# Patient Record
Sex: MALE | Race: WHITE | Employment: OTHER | URBAN - METROPOLITAN AREA
[De-identification: names, ages, dates, MRNs, and addresses within clinical notes are randomized per-mention and may not be internally consistent; named-entity substitution may affect disease eponyms.]

---

## 2019-04-06 ENCOUNTER — OFFICE VISIT (OUTPATIENT)
Dept: FAMILY MEDICINE CLINIC | Age: 69
End: 2019-04-06
Payer: COMMERCIAL

## 2019-04-06 VITALS
HEART RATE: 80 BPM | DIASTOLIC BLOOD PRESSURE: 84 MMHG | SYSTOLIC BLOOD PRESSURE: 122 MMHG | WEIGHT: 252.4 LBS | OXYGEN SATURATION: 98 % | BODY MASS INDEX: 36.13 KG/M2 | RESPIRATION RATE: 12 BRPM | TEMPERATURE: 98.5 F | HEIGHT: 70 IN

## 2019-04-06 DIAGNOSIS — H66.91 RIGHT OTITIS MEDIA, UNSPECIFIED OTITIS MEDIA TYPE: Primary | ICD-10-CM

## 2019-04-06 DIAGNOSIS — J01.90 ACUTE SINUSITIS, RECURRENCE NOT SPECIFIED, UNSPECIFIED LOCATION: ICD-10-CM

## 2019-04-06 PROCEDURE — G8417 CALC BMI ABV UP PARAM F/U: HCPCS | Performed by: NURSE PRACTITIONER

## 2019-04-06 PROCEDURE — 4040F PNEUMOC VAC/ADMIN/RCVD: CPT | Performed by: NURSE PRACTITIONER

## 2019-04-06 PROCEDURE — 4004F PT TOBACCO SCREEN RCVD TLK: CPT | Performed by: NURSE PRACTITIONER

## 2019-04-06 PROCEDURE — 3017F COLORECTAL CA SCREEN DOC REV: CPT | Performed by: NURSE PRACTITIONER

## 2019-04-06 PROCEDURE — G8598 ASA/ANTIPLAT THER USED: HCPCS | Performed by: NURSE PRACTITIONER

## 2019-04-06 PROCEDURE — 99213 OFFICE O/P EST LOW 20 MIN: CPT | Performed by: NURSE PRACTITIONER

## 2019-04-06 PROCEDURE — 1123F ACP DISCUSS/DSCN MKR DOCD: CPT | Performed by: NURSE PRACTITIONER

## 2019-04-06 PROCEDURE — G8427 DOCREV CUR MEDS BY ELIG CLIN: HCPCS | Performed by: NURSE PRACTITIONER

## 2019-04-06 RX ORDER — FLUTICASONE PROPIONATE 50 MCG
2 SPRAY, SUSPENSION (ML) NASAL DAILY
Qty: 1 BOTTLE | Refills: 0 | Status: SHIPPED | OUTPATIENT
Start: 2019-04-06 | End: 2021-05-27

## 2019-04-06 RX ORDER — AMOXICILLIN AND CLAVULANATE POTASSIUM 875; 125 MG/1; MG/1
1 TABLET, FILM COATED ORAL 2 TIMES DAILY
Qty: 14 TABLET | Refills: 0 | Status: SHIPPED | OUTPATIENT
Start: 2019-04-06 | End: 2019-04-13

## 2019-04-06 ASSESSMENT — ENCOUNTER SYMPTOMS
SINUS PRESSURE: 0
SORE THROAT: 0
COUGH: 0
WHEEZING: 0
SHORTNESS OF BREATH: 0
RHINORRHEA: 0
SINUS PAIN: 0

## 2019-04-06 NOTE — PROGRESS NOTES
Subjective:      Patient ID: Purvi Cordova is a 80 y.o. male who presents today for:  Chief Complaint   Patient presents with    Otalgia     Right ear pain X 3 months. Pain is aggravated with chewing and blowing his nose. He admits to a chronic cough throughout the winter months. Otalgia    There is pain in the right ear. This is a new problem. Episode onset: x 3 months ago. The problem has been unchanged. There has been no fever. Pertinent negatives include no coughing, rhinorrhea or sore throat. He has tried nothing (Was on Augmenin back in Jan for sinusitis) for the symptoms. Still having sinus issues, runny nose, congestion    Had terrible cough back in Jan which has since subsided  Had tooth pulled in Dec, noticed pain around that time    Past Medical History:   Diagnosis Date    Screening for diabetic retinopathy     March 2014 outside  : Per member     No past surgical history on file. No family history on file. Current Outpatient Medications on File Prior to Visit   Medication Sig Dispense Refill    lisinopril (PRINIVIL;ZESTRIL) 5 MG tablet TAKE ONE TABLET BY MOUTH TWICE A  tablet 0    Handicap Placard MISC by Does not apply route Duration 1 year 1 each 0    metFORMIN (GLUCOPHAGE) 500 MG tablet TAKE ONE TABLET BY MOUTH TWICE A DAY WITH FOOD FOR DIABETES OR AS DIRECTED 180 tablet 1    metoprolol (LOPRESSOR) 25 MG tablet TAKE ONE TABLET BY MOUTH TWICE A  tablet 3    aspirin 81 MG tablet Take 81 mg by mouth daily. No current facility-administered medications on file prior to visit. Allergies:  Sulfamethoxazole-trimethoprim    Review of Systems   Constitutional: Negative for chills and fever. HENT: Positive for congestion and ear pain. Negative for postnasal drip, rhinorrhea, sinus pressure, sinus pain and sore throat. Respiratory: Negative for cough, shortness of breath and wheezing. Cardiovascular: Negative for chest pain.        Objective: improve, for follow up with PCP. Discussed possibility of TMJ disorder also. Patient has follow up appointment with Dr. Destinee Beal already scheduled for 4/15. Encouraged eating yogurt or taking probiotic daily while on atb to help promote gut health as the use of any atb can cause intestinal infections such as c.diff. Reviewed with the patient: current clinical status, medications, activities and diet. Discussed signs and symptoms which require immediate follow-up in ED/call to 911. Understanding verbalized. Side effects, adverse effects of the medication prescribed today, as well as treatment plan/ rationale and result expectations have been discussed with the patient who expresses understanding and desires to proceed. Close follow up to evaluate treatment results and for coordination of care. I have reviewed the patient's medical history in detail and updated the computerized patient record.     Ramona Galvez, ERIC - CNP

## 2019-05-03 ENCOUNTER — OFFICE VISIT (OUTPATIENT)
Dept: FAMILY MEDICINE CLINIC | Age: 69
End: 2019-05-03
Payer: COMMERCIAL

## 2019-05-03 VITALS
OXYGEN SATURATION: 98 % | HEIGHT: 70 IN | RESPIRATION RATE: 20 BRPM | SYSTOLIC BLOOD PRESSURE: 112 MMHG | WEIGHT: 250 LBS | TEMPERATURE: 98.3 F | BODY MASS INDEX: 35.79 KG/M2 | DIASTOLIC BLOOD PRESSURE: 64 MMHG | HEART RATE: 73 BPM

## 2019-05-03 DIAGNOSIS — E78.5 TYPE 2 DIABETES MELLITUS WITH HYPERLIPIDEMIA (HCC): Primary | ICD-10-CM

## 2019-05-03 DIAGNOSIS — H66.91 RIGHT OTITIS MEDIA, UNSPECIFIED OTITIS MEDIA TYPE: ICD-10-CM

## 2019-05-03 DIAGNOSIS — I25.118 CORONARY ARTERY DISEASE INVOLVING NATIVE HEART WITH OTHER FORM OF ANGINA PECTORIS, UNSPECIFIED VESSEL OR LESION TYPE (HCC): ICD-10-CM

## 2019-05-03 DIAGNOSIS — E78.5 TYPE 2 DIABETES MELLITUS WITH HYPERLIPIDEMIA (HCC): ICD-10-CM

## 2019-05-03 DIAGNOSIS — E11.69 TYPE 2 DIABETES MELLITUS WITH HYPERLIPIDEMIA (HCC): Primary | ICD-10-CM

## 2019-05-03 DIAGNOSIS — E11.69 TYPE 2 DIABETES MELLITUS WITH HYPERLIPIDEMIA (HCC): ICD-10-CM

## 2019-05-03 DIAGNOSIS — Z12.11 SCREEN FOR COLON CANCER: ICD-10-CM

## 2019-05-03 LAB
CREATININE URINE: 116.9 MG/DL
HBA1C MFR BLD: 6.2 %
MICROALBUMIN UR-MCNC: 67.9 MG/DL
MICROALBUMIN/CREAT UR-RTO: 580.8 MG/G (ref 0–30)

## 2019-05-03 PROCEDURE — 3017F COLORECTAL CA SCREEN DOC REV: CPT | Performed by: FAMILY MEDICINE

## 2019-05-03 PROCEDURE — 4040F PNEUMOC VAC/ADMIN/RCVD: CPT | Performed by: FAMILY MEDICINE

## 2019-05-03 PROCEDURE — 1036F TOBACCO NON-USER: CPT | Performed by: FAMILY MEDICINE

## 2019-05-03 PROCEDURE — 2022F DILAT RTA XM EVC RTNOPTHY: CPT | Performed by: FAMILY MEDICINE

## 2019-05-03 PROCEDURE — 83036 HEMOGLOBIN GLYCOSYLATED A1C: CPT | Performed by: FAMILY MEDICINE

## 2019-05-03 PROCEDURE — 1123F ACP DISCUSS/DSCN MKR DOCD: CPT | Performed by: FAMILY MEDICINE

## 2019-05-03 PROCEDURE — G8598 ASA/ANTIPLAT THER USED: HCPCS | Performed by: FAMILY MEDICINE

## 2019-05-03 PROCEDURE — G8427 DOCREV CUR MEDS BY ELIG CLIN: HCPCS | Performed by: FAMILY MEDICINE

## 2019-05-03 PROCEDURE — G8417 CALC BMI ABV UP PARAM F/U: HCPCS | Performed by: FAMILY MEDICINE

## 2019-05-03 PROCEDURE — 99204 OFFICE O/P NEW MOD 45 MIN: CPT | Performed by: FAMILY MEDICINE

## 2019-05-03 PROCEDURE — 3044F HG A1C LEVEL LT 7.0%: CPT | Performed by: FAMILY MEDICINE

## 2019-05-03 SDOH — HEALTH STABILITY: MENTAL HEALTH: HOW MANY STANDARD DRINKS CONTAINING ALCOHOL DO YOU HAVE ON A TYPICAL DAY?: 1 OR 2

## 2019-05-03 SDOH — HEALTH STABILITY: MENTAL HEALTH: HOW OFTEN DO YOU HAVE A DRINK CONTAINING ALCOHOL?: 2-4 TIMES A MONTH

## 2019-05-03 ASSESSMENT — ENCOUNTER SYMPTOMS
EYES NEGATIVE: 1
ALLERGIC/IMMUNOLOGIC NEGATIVE: 1
RESPIRATORY NEGATIVE: 1
GASTROINTESTINAL NEGATIVE: 1
SHORTNESS OF BREATH: 0

## 2019-05-03 ASSESSMENT — PATIENT HEALTH QUESTIONNAIRE - PHQ9
2. FEELING DOWN, DEPRESSED OR HOPELESS: 0
1. LITTLE INTEREST OR PLEASURE IN DOING THINGS: 0
SUM OF ALL RESPONSES TO PHQ9 QUESTIONS 1 & 2: 0
SUM OF ALL RESPONSES TO PHQ QUESTIONS 1-9: 0
SUM OF ALL RESPONSES TO PHQ QUESTIONS 1-9: 0

## 2019-05-03 NOTE — PROGRESS NOTES
Patient is seen in follow up for   Chief Complaint   Patient presents with   [facility] Doctor     Patient here to establish care, former PCP Dr. Brandon James Maintenance     advised to have dm eye exam, - foot exam, blood work      Diabetes   He presents for his follow-up diabetic visit. He has type 2 diabetes mellitus. His disease course has been stable. There are no hypoglycemic associated symptoms. There are no diabetic associated symptoms. Pertinent negatives for diabetes include no chest pain. There are no hypoglycemic complications. Symptoms are stable. There are no diabetic complications. Risk factors for coronary artery disease include diabetes mellitus, hypertension and male sex. Current diabetic treatment includes oral agent (monotherapy). He is compliant with treatment most of the time. Past Medical History:   Diagnosis Date    Screening for diabetic retinopathy     March 2014 outside  : Per member     Patient Active Problem List    Diagnosis Date Noted    Type 2 diabetes mellitus with hyperlipidemia ([facility] [address] 04/12/2016    Knee pain 06/19/2015    Encounter for physical therapy 06/19/2015    Presence of stent in anterior descending branch of left coronary artery 12/21/2009    Acute MI ([facility] [address] 12/21/2009    CAD (coronary artery disease) 11/27/2009    History of tobacco use 11/27/2009    Hx of vasectomy 01/28/2002     Past Surgical History:   Procedure Laterality Date    CATARACT REMOVAL WITH IMPLANT Bilateral 2018     History reviewed. No pertinent family history.   Social History     Socioeconomic History    Marital status:      Spouse name: None    Number of children: None    Years of education: None    Highest education level: None   Occupational History    None   Social Needs    Financial resource strain: None    Food insecurity:     Worry: None     Inability: None    Transportation needs:     Medical: None     Non-medical: None   Tobacco Use    Smoking status: TABLET BY MOUTH TWICE A DAY WITH FOOD FOR DIABETES OR AS DIRECTED 180 tablet 1    metoprolol (LOPRESSOR) 25 MG tablet TAKE ONE TABLET BY MOUTH TWICE A  tablet 3    aspirin 81 MG tablet Take 81 mg by mouth daily. No current facility-administered medications on file prior to visit. Allergies   Allergen Reactions    Sulfamethoxazole-Trimethoprim Hives and Other (See Comments)     07/18/2005;SULFA, 07/18/2005;SULFA     Health Maintenance   Topic Date Due    Hepatitis C screen  1950    Diabetic microalbuminuria test  03/14/2016    Lipid screen  03/14/2016    Diabetic retinal exam  07/30/2016    Diabetic foot exam  08/31/2016    A1C test (Diabetic or Prediabetic)  08/31/2016    Potassium monitoring  08/31/2016    Creatinine monitoring  08/31/2016    Colon Cancer Screen FIT/FOBT  09/03/2016    Shingles Vaccine (2 of 3) 05/28/2018    DTaP/Tdap/Td vaccine (2 - Td) 12/30/2025    Flu vaccine  Completed    Pneumococcal 65+ years Vaccine  Completed       Review of Systems     Review of Systems   Constitutional: Negative for activity change, appetite change, chills, fever and unexpected weight change. HENT: Negative. Eyes: Negative. Respiratory: Negative. Negative for shortness of breath. Cardiovascular: Negative. Negative for chest pain and palpitations. Gastrointestinal: Negative. Endocrine: Negative. Genitourinary: Negative. Musculoskeletal: Negative. Skin: Negative. Allergic/Immunologic: Negative. Neurological: Negative. Hematological: Negative. Psychiatric/Behavioral: Negative. Physical Exam  Vitals:    05/03/19 1114   BP: 112/64   Site: Left Upper Arm   Position: Sitting   Cuff Size: Large Adult   Pulse: 73   Resp: 20   Temp: 98.3 °F (36.8 °C)   TempSrc: Oral   SpO2: 98%   Weight: 250 lb (113.4 kg)   Height: 5' 10\" (1.778 m)       Physical Exam   Constitutional: He is oriented to person, place, and time.  He appears well-developed and well-nourished. HENT:   Right Ear: External ear normal.   Left Ear: External ear normal.   Eyes: Pupils are equal, round, and reactive to light. Conjunctivae and EOM are normal.   Neck: Normal range of motion. Neck supple. No thyromegaly present. Cardiovascular: Normal rate, regular rhythm, normal heart sounds and intact distal pulses. Exam reveals no gallop and no friction rub. No murmur heard. Pulmonary/Chest: Effort normal and breath sounds normal. No respiratory distress. He has no wheezes. Abdominal: Soft. Bowel sounds are normal. He exhibits no distension and no mass. There is no tenderness. There is no rebound and no guarding. No hernia. Genitourinary: Penis normal.   Musculoskeletal: Normal range of motion. He exhibits no edema or tenderness. Lymphadenopathy:     He has no cervical adenopathy. Neurological: He is alert and oriented to person, place, and time. No cranial nerve deficit. Coordination normal.   Skin: Skin is warm and dry. Psychiatric: He has a normal mood and affect. Assessment   Diagnosis Orders   1. Type 2 diabetes mellitus with hyperlipidemia (HCC)  POCT glycosylated hemoglobin (Hb A1C)    Comprehensive Metabolic Panel    Microalbumin / Creatinine Urine Ratio    Lipid Panel     DIABETES FOOT EXAM   2. Screen for colon cancer  An Swanson MD, Gastroenterology, [place]   3. Right otitis media, unspecified otitis media type     4.  Coronary artery disease involving native heart with other form of angina pectoris, unspecified vessel or lesion type [facility])  Dion Stuart MD, Cardiology, [place]     Problem List     CAD (coronary artery disease)    Relevant Medications    aspirin 81 MG tablet    metoprolol (LOPRESSOR) 25 MG tablet    lisinopril (PRINIVIL;ZESTRIL) 5 MG tablet    Other Relevant Orders    Dion Stuart MD, Cardiology, [place]    Type 2 diabetes mellitus with hyperlipidemia ([facility] [address] - Primary    Relevant Medications    aspirin 81 MG tablet    metoprolol (LOPRESSOR) 25 MG tablet    metFORMIN (GLUCOPHAGE) 500 MG tablet    lisinopril (PRINIVIL;ZESTRIL) 5 MG tablet    Other Relevant Orders    POCT glycosylated hemoglobin (Hb A1C) (Completed)    Comprehensive Metabolic Panel    Microalbumin / Creatinine Urine Ratio    Lipid Panel    HM DIABETES FOOT EXAM (Completed)          Plan  Orders Placed This Encounter   Procedures    Comprehensive Metabolic Panel     Standing Status:   Future     Standing Expiration Date:   5/2/2020    Microalbumin / Creatinine Urine Ratio     Standing Status:   Future     Standing Expiration Date:   5/2/2020    Lipid Panel     Standing Status:   Future     Standing Expiration Date:   5/2/2020     Order Specific Question:   Is Patient Fasting?/# of Hours     Answer:   8-12   Dominic Banuelos MD, Gastroenterology, [facility]     Referral Priority:   Routine     Referral Type:   Eval and Treat     Referral Reason:   Specialty Services Required     Referred to Provider:   Mindi Chance MD     Requested Specialty:   Gastroenterology     Number of Visits Requested:   Kaleb Aguirre MD, Cardiology, [facility]     Referral Priority:   Routine     Referral Type:   Eval and Treat     Referral Reason:   Specialty Services Required     Referred to Provider:   Kt Fish MD     Requested Specialty:   Interventional Cardiology     Number of Visits Requested:   1    POCT glycosylated hemoglobin (Hb A1C)     DIABETES FOOT EXAM     No orders of the defined types were placed in this encounter. Return in about 6 months (around 11/3/2019).   Kalie Mc MD

## 2019-05-06 DIAGNOSIS — I25.10 CORONARY ARTERY DISEASE INVOLVING NATIVE CORONARY ARTERY OF NATIVE HEART WITHOUT ANGINA PECTORIS: ICD-10-CM

## 2019-05-06 DIAGNOSIS — E11.9 DIABETES TYPE 2, CONTROLLED (HCC): ICD-10-CM

## 2019-05-06 RX ORDER — LISINOPRIL 5 MG/1
5 TABLET ORAL DAILY
Qty: 90 TABLET | Refills: 1 | Status: SHIPPED | OUTPATIENT
Start: 2019-05-06 | End: 2019-10-10 | Stop reason: SDUPTHER

## 2019-05-06 NOTE — TELEPHONE ENCOUNTER
Last appt 5/3/19    Pt's states his ins co told him to call the office to get the ball rolling on where he should be getting his meds as previously went to [facility].  Now needs new scripts to go to mail away service, [facility] @ [phone]

## 2019-06-05 ENCOUNTER — OFFICE VISIT (OUTPATIENT)
Dept: CARDIOLOGY CLINIC | Age: 69
End: 2019-06-05
Payer: COMMERCIAL

## 2019-06-05 VITALS
DIASTOLIC BLOOD PRESSURE: 82 MMHG | SYSTOLIC BLOOD PRESSURE: 122 MMHG | BODY MASS INDEX: 35.44 KG/M2 | OXYGEN SATURATION: 99 % | HEART RATE: 85 BPM | WEIGHT: 247 LBS | RESPIRATION RATE: 16 BRPM

## 2019-06-05 DIAGNOSIS — I21.9 ACUTE MYOCARDIAL INFARCTION, UNSPECIFIED MI TYPE, UNSPECIFIED ARTERY (HCC): ICD-10-CM

## 2019-06-05 DIAGNOSIS — I25.118 CORONARY ARTERY DISEASE INVOLVING NATIVE HEART WITH OTHER FORM OF ANGINA PECTORIS, UNSPECIFIED VESSEL OR LESION TYPE (HCC): Primary | ICD-10-CM

## 2019-06-05 DIAGNOSIS — Z95.5 PRESENCE OF STENT IN ANTERIOR DESCENDING BRANCH OF LEFT CORONARY ARTERY: ICD-10-CM

## 2019-06-05 PROCEDURE — 99244 OFF/OP CNSLTJ NEW/EST MOD 40: CPT | Performed by: INTERNAL MEDICINE

## 2019-06-05 PROCEDURE — 93000 ELECTROCARDIOGRAM COMPLETE: CPT | Performed by: INTERNAL MEDICINE

## 2019-06-05 PROCEDURE — G8417 CALC BMI ABV UP PARAM F/U: HCPCS | Performed by: INTERNAL MEDICINE

## 2019-06-05 PROCEDURE — G8427 DOCREV CUR MEDS BY ELIG CLIN: HCPCS | Performed by: INTERNAL MEDICINE

## 2019-06-05 ASSESSMENT — ENCOUNTER SYMPTOMS
COLOR CHANGE: 0
EYE DISCHARGE: 0
NAUSEA: 0
EYE PAIN: 0
CHOKING: 0
COUGH: 0
SHORTNESS OF BREATH: 0
TROUBLE SWALLOWING: 0
WHEEZING: 0
ABDOMINAL PAIN: 0
STRIDOR: 0
ABDOMINAL DISTENTION: 0
CHEST TIGHTNESS: 0
APNEA: 0

## 2019-06-05 NOTE — PROGRESS NOTES
[facility] CARDIOLOGY OFFICE CONSULT      Patient: Lashon Whitehead  YOB: 1950  MRN: [id number]    Chief Complaint:  Chief Complaint   Patient presents with   [facility] Establish Cardiologist     REFERRED BY DR Chandrika Nice [facility]    Coronary Artery Disease       Subjective/HPI    The patient presents for evaluation as a referral from  [facility]     The patient has been having the following problems: CAD    Cardiac diagnoses prior to this visit: CAD, remote MI LAD territory EF 40% PCI 2009    Testing performed prior to this visit: none recently. The patient reports mowing the lawn and was breaking a sweat. Sat and rested for 10 minutes. No chest pain or shortness of breath. Additional history includes HTN, DM, HPL.      EKG: NSR no ischemia    Past Medical History:   Diagnosis Date    CAD (coronary artery disease)     Diabetes mellitus ([facility] [address]     Hypertension     Screening for diabetic retinopathy     March 2014 outside  : Per member       Past Surgical History:   Procedure Laterality Date    CATARACT REMOVAL WITH IMPLANT Bilateral 2018    DIAGNOSTIC CARDIAC CATH LAB PROCEDURE  11/2009       Family History   Problem Relation Age of Onset    No Known Problems Father     Diabetes Maternal Grandmother        Social History     Socioeconomic History    Marital status:      Spouse name: None    Number of children: None    Years of education: None    Highest education level: None   Occupational History    None   Social Needs    Financial resource strain: None    Food insecurity:     Worry: None     Inability: None    Transportation needs:     Medical: None     Non-medical: None   Tobacco Use    Smoking status: Never Smoker    Smokeless tobacco: Never Used   Substance and Sexual Activity    Alcohol use: Yes     Frequency: 2-4 times a month     Drinks per session: 1 or 2     Binge frequency: Never     Comment: WINE ONLY    Drug use: No    Sexual activity: Yes     Partners: Female Physical Exam   Constitutional: He appears healthy. No distress. HENT:   Mouth/Throat: Oropharynx is clear. Eyes: Pupils are equal, round, and reactive to light. Neck: Normal range of motion. No JVD present. Cardiovascular: Regular rhythm, S1 normal, S2 normal, normal heart sounds and intact distal pulses. Exam reveals no gallop. No murmur heard. Pulses:       Radial pulses are 2+ on the right side. Dorsalis pedis pulses are 2+ on the right side. Pulmonary/Chest: Effort normal and breath sounds normal. He has no wheezes. He has no rales. He exhibits no tenderness. Abdominal: Soft. Bowel sounds are normal.   Musculoskeletal: Normal range of motion. He exhibits no edema. Neurological: He is alert and oriented to person, place, and time. He has intact cranial nerves. Skin: Skin is warm and dry. No rash noted.        LABS:  CBC: No results found for: WBC, RBC, HGB, HCT, MCV, MCH, MCHC, RDW, PLT, MPV  Lipids:  Lab Results   Component Value Date    CHOL 154 03/14/2015    CHOL 139 03/21/2014    CHOL 130 02/27/2014     Lab Results   Component Value Date    TRIG 127 03/14/2015    TRIG 104 03/21/2014    TRIG 139 06/15/2012     Lab Results   Component Value Date    HDL 45 03/14/2015    HDL 33 (L) 03/21/2014    HDL 33 (L) 02/27/2014     Lab Results   Component Value Date    LDLCALC 84 03/14/2015    LDLCALC 85 03/21/2014    LDLCALC 86 06/15/2012     No results found for: LABVLDL, VLDL  No results found for: CHOLHDLRATIO  CMP:    Lab Results   Component Value Date     08/31/2015    K 4.6 08/31/2015    CL 99 08/31/2015    CO2 21 08/31/2015    BUN 19 08/31/2015    CREATININE 0.66 08/31/2015    GFRAA >60.0 08/31/2015    LABGLOM >60.0 08/31/2015    GLUCOSE 92 08/31/2015    PROT 6.9 03/14/2015    LABALBU 4.0 03/14/2015    CALCIUM 9.6 08/31/2015    BILITOT 0.9 03/14/2015    ALKPHOS 99 03/14/2015    AST 18 03/14/2015    ALT 18 03/14/2015     BMP:    Lab Results   Component Value Date     08/31/2015 K 4.6 08/31/2015    CL 99 08/31/2015    CO2 21 08/31/2015    BUN 19 08/31/2015    LABALBU 4.0 03/14/2015    CREATININE 0.66 08/31/2015    CALCIUM 9.6 08/31/2015    GFRAA >60.0 08/31/2015    LABGLOM >60.0 08/31/2015    GLUCOSE 92 08/31/2015     Magnesium:  No results found for: MG  TSH:No results found for: TSHFT4, TSH    Patient Active Problem List   Diagnosis    Hx of vasectomy    CAD (coronary artery disease)    History of tobacco use    Presence of stent in anterior descending branch of left coronary artery    Acute MI ([facility] [address]    Knee pain    Encounter for physical therapy    Type 2 diabetes mellitus with hyperlipidemia (HCC)       Current Outpatient Medications   Medication Sig Dispense Refill    lisinopril (PRINIVIL;ZESTRIL) 5 MG tablet Take 1 tablet by mouth daily 90 tablet 1    metFORMIN (GLUCOPHAGE) 500 MG tablet Take 1 tablet by mouth 2 times daily (with meals) 180 tablet 1    metoprolol tartrate (LOPRESSOR) 25 MG tablet Pt take half tab b.i.d. 90 tablet 1    fluticasone (FLONASE) 50 MCG/ACT nasal spray 2 sprays by Each Nare route daily 1 Bottle 0    Handicap Placard MISC by Does not apply route Duration 1 year 1 each 0    aspirin 81 MG tablet Take 81 mg by mouth daily. No current facility-administered medications for this visit. Assessment/Plan:    1. Coronary artery disease involving native heart with other form of angina pectoris, unspecified vessel or lesion type (HCC)  No ischemia eval in more than 5 years, will check stress test.  H/O MI, PCI, CAD. Statin intolerance, long discussion regarding options. - EKG 12 lead  - ECHO Complete 2D W Doppler W Color; Future  - NM MYOCARDIAL SPECT REST EXERCISE OR RX; Future    2. Presence of stent in anterior descending branch of left coronary artery    - EKG 12 lead    3.  Acute myocardial infarction, unspecified MI type, unspecified artery (HCC)    - EKG 12 lead     Statin intolerant     Counseling:  Heart Healthy Lifestyle, Improve BMI and Walk Daily     Return in about 1 month (around 7/3/2019) for results of test.      Electronically signed by Harper Cramer MD on 6/5/2019 at 1:58 PM

## 2019-10-10 DIAGNOSIS — E11.9 DIABETES TYPE 2, CONTROLLED (HCC): ICD-10-CM

## 2019-10-10 RX ORDER — LISINOPRIL 5 MG/1
TABLET ORAL
Qty: 90 TABLET | Refills: 1 | Status: SHIPPED | OUTPATIENT
Start: 2019-10-10 | End: 2020-04-07

## 2019-10-22 DIAGNOSIS — E11.9 DIABETES TYPE 2, CONTROLLED (HCC): ICD-10-CM

## 2019-10-25 DIAGNOSIS — I25.10 CORONARY ARTERY DISEASE INVOLVING NATIVE CORONARY ARTERY OF NATIVE HEART WITHOUT ANGINA PECTORIS: ICD-10-CM

## 2019-11-07 LAB — DIABETIC RETINOPATHY: NEGATIVE

## 2020-04-07 RX ORDER — LISINOPRIL 5 MG/1
TABLET ORAL
Qty: 90 TABLET | Refills: 0 | Status: SHIPPED | OUTPATIENT
Start: 2020-04-07 | End: 2020-08-07 | Stop reason: SDUPTHER

## 2020-05-20 ENCOUNTER — TELEPHONE (OUTPATIENT)
Dept: FAMILY MEDICINE CLINIC | Age: 70
End: 2020-05-20

## 2020-07-07 NOTE — TELEPHONE ENCOUNTER
Patient has an appt scheduled for 8/7. He declined a virtual appt.     Requested Prescriptions     Pending Prescriptions Disp Refills    metoprolol tartrate (LOPRESSOR) 25 MG tablet 90 tablet 4     Sig: TAKE ONE-HALF (1/2) TABLET TWICE A DAY

## 2020-08-07 ENCOUNTER — OFFICE VISIT (OUTPATIENT)
Dept: FAMILY MEDICINE CLINIC | Age: 70
End: 2020-08-07
Payer: COMMERCIAL

## 2020-08-07 VITALS
OXYGEN SATURATION: 97 % | DIASTOLIC BLOOD PRESSURE: 85 MMHG | WEIGHT: 255 LBS | TEMPERATURE: 97 F | SYSTOLIC BLOOD PRESSURE: 126 MMHG | HEIGHT: 70 IN | HEART RATE: 80 BPM | BODY MASS INDEX: 36.51 KG/M2

## 2020-08-07 DIAGNOSIS — E11.9 TYPE 2 DIABETES MELLITUS WITHOUT COMPLICATION, UNSPECIFIED WHETHER LONG TERM INSULIN USE (HCC): ICD-10-CM

## 2020-08-07 DIAGNOSIS — Z12.5 PROSTATE CANCER SCREENING: ICD-10-CM

## 2020-08-07 LAB
ALBUMIN SERPL-MCNC: 4.1 G/DL (ref 3.5–4.6)
ALP BLD-CCNC: 70 U/L (ref 35–104)
ALT SERPL-CCNC: 27 U/L (ref 0–41)
ANION GAP SERPL CALCULATED.3IONS-SCNC: 11 MEQ/L (ref 9–15)
AST SERPL-CCNC: 20 U/L (ref 0–40)
BASOPHILS ABSOLUTE: 0 K/UL (ref 0–0.2)
BASOPHILS RELATIVE PERCENT: 0.5 %
BILIRUB SERPL-MCNC: 0.6 MG/DL (ref 0.2–0.7)
BUN BLDV-MCNC: 20 MG/DL (ref 8–23)
CALCIUM SERPL-MCNC: 9.6 MG/DL (ref 8.5–9.9)
CHLORIDE BLD-SCNC: 101 MEQ/L (ref 95–107)
CHOLESTEROL, TOTAL: 251 MG/DL (ref 0–199)
CO2: 25 MEQ/L (ref 20–31)
CREAT SERPL-MCNC: 0.79 MG/DL (ref 0.7–1.2)
CREATININE URINE: 155.2 MG/DL
EOSINOPHILS ABSOLUTE: 0.1 K/UL (ref 0–0.7)
EOSINOPHILS RELATIVE PERCENT: 1.4 %
GFR AFRICAN AMERICAN: >60
GFR NON-AFRICAN AMERICAN: >60
GLOBULIN: 3.4 G/DL (ref 2.3–3.5)
GLUCOSE BLD-MCNC: 124 MG/DL (ref 70–99)
HBA1C MFR BLD: 6.7 %
HCT VFR BLD CALC: 43.3 % (ref 42–52)
HDLC SERPL-MCNC: 40 MG/DL (ref 40–59)
HEMOGLOBIN: 14.2 G/DL (ref 14–18)
LDL CHOLESTEROL CALCULATED: 147 MG/DL (ref 0–129)
LYMPHOCYTES ABSOLUTE: 1.3 K/UL (ref 1–4.8)
LYMPHOCYTES RELATIVE PERCENT: 18.5 %
MCH RBC QN AUTO: 28.8 PG (ref 27–31.3)
MCHC RBC AUTO-ENTMCNC: 32.9 % (ref 33–37)
MCV RBC AUTO: 87.6 FL (ref 80–100)
MICROALBUMIN UR-MCNC: 95.1 MG/DL
MICROALBUMIN/CREAT UR-RTO: 612.8 MG/G (ref 0–30)
MONOCYTES ABSOLUTE: 0.8 K/UL (ref 0.2–0.8)
MONOCYTES RELATIVE PERCENT: 11.5 %
NEUTROPHILS ABSOLUTE: 4.9 K/UL (ref 1.4–6.5)
NEUTROPHILS RELATIVE PERCENT: 68.1 %
PDW BLD-RTO: 13.3 % (ref 11.5–14.5)
PLATELET # BLD: 323 K/UL (ref 130–400)
POTASSIUM SERPL-SCNC: 4.5 MEQ/L (ref 3.4–4.9)
PROSTATE SPECIFIC ANTIGEN: 1.05 NG/ML (ref 0–5.4)
RBC # BLD: 4.94 M/UL (ref 4.7–6.1)
SODIUM BLD-SCNC: 137 MEQ/L (ref 135–144)
TOTAL PROTEIN: 7.5 G/DL (ref 6.3–8)
TRIGL SERPL-MCNC: 320 MG/DL (ref 0–150)
WBC # BLD: 7.1 K/UL (ref 4.8–10.8)

## 2020-08-07 PROCEDURE — 1036F TOBACCO NON-USER: CPT | Performed by: FAMILY MEDICINE

## 2020-08-07 PROCEDURE — 4040F PNEUMOC VAC/ADMIN/RCVD: CPT | Performed by: FAMILY MEDICINE

## 2020-08-07 PROCEDURE — 3044F HG A1C LEVEL LT 7.0%: CPT | Performed by: FAMILY MEDICINE

## 2020-08-07 PROCEDURE — 99214 OFFICE O/P EST MOD 30 MIN: CPT | Performed by: FAMILY MEDICINE

## 2020-08-07 PROCEDURE — 2022F DILAT RTA XM EVC RTNOPTHY: CPT | Performed by: FAMILY MEDICINE

## 2020-08-07 PROCEDURE — G8427 DOCREV CUR MEDS BY ELIG CLIN: HCPCS | Performed by: FAMILY MEDICINE

## 2020-08-07 PROCEDURE — G8417 CALC BMI ABV UP PARAM F/U: HCPCS | Performed by: FAMILY MEDICINE

## 2020-08-07 PROCEDURE — 1123F ACP DISCUSS/DSCN MKR DOCD: CPT | Performed by: FAMILY MEDICINE

## 2020-08-07 PROCEDURE — 83036 HEMOGLOBIN GLYCOSYLATED A1C: CPT | Performed by: FAMILY MEDICINE

## 2020-08-07 PROCEDURE — 3017F COLORECTAL CA SCREEN DOC REV: CPT | Performed by: FAMILY MEDICINE

## 2020-08-07 RX ORDER — LISINOPRIL 5 MG/1
5 TABLET ORAL DAILY
Qty: 90 TABLET | Refills: 3 | Status: SHIPPED | OUTPATIENT
Start: 2020-08-07 | End: 2020-09-11 | Stop reason: SDUPTHER

## 2020-08-07 RX ORDER — LISINOPRIL 5 MG/1
TABLET ORAL
Qty: 90 TABLET | Refills: 3 | Status: SHIPPED | OUTPATIENT
Start: 2020-08-07 | End: 2020-08-07 | Stop reason: SDUPTHER

## 2020-08-07 RX ORDER — LISINOPRIL 5 MG/1
TABLET ORAL
Qty: 30 TABLET | Refills: 0 | Status: SHIPPED | OUTPATIENT
Start: 2020-08-07 | End: 2020-10-14

## 2020-08-07 ASSESSMENT — ENCOUNTER SYMPTOMS
SHORTNESS OF BREATH: 0
EYES NEGATIVE: 1
RESPIRATORY NEGATIVE: 1
GASTROINTESTINAL NEGATIVE: 1
ALLERGIC/IMMUNOLOGIC NEGATIVE: 1

## 2020-08-07 NOTE — PROGRESS NOTES
Patient is seen in follow up for   Chief Complaint   Patient presents with    Diabetes     Patient presents today to follow up on diabetes.  Hypertension     Patient presents today to follow up on hypertension.  Health Maintenance     Patient would like stefany. Diabetes   He presents for his follow-up diabetic visit. He has type 2 diabetes mellitus. His disease course has been stable. There are no hypoglycemic associated symptoms. There are no diabetic associated symptoms. Pertinent negatives for diabetes include no chest pain. There are no hypoglycemic complications. Symptoms are stable. There are no diabetic complications. Risk factors for coronary artery disease include diabetes mellitus, hypertension and male sex. Current diabetic treatment includes oral agent (monotherapy). He is compliant with treatment most of the time. Hypertension   This is a chronic problem. The current episode started more than 1 year ago. The problem is unchanged. The problem is controlled. Pertinent negatives include no chest pain, palpitations or shortness of breath. There are no associated agents to hypertension. Risk factors for coronary artery disease include diabetes mellitus and male gender. Past treatments include ACE inhibitors and beta blockers. The current treatment provides moderate improvement. There are no compliance problems.         Past Medical History:   Diagnosis Date    CAD (coronary artery disease)     Diabetes mellitus ([address]     Hypertension     Screening for diabetic retinopathy     March 2014 outside  : Per member     Patient Active Problem List    Diagnosis Date Noted    Type 2 diabetes mellitus with hyperlipidemia ([address] 04/12/2016    Knee pain 06/19/2015    Encounter for physical therapy 06/19/2015    Presence of stent in anterior descending branch of left coronary artery 12/21/2009    Acute MI ([address] 12/21/2009    CAD (coronary artery disease) 11/27/2009    History of tobacco use 11/27/2009    Hx of vasectomy 01/28/2002     Past Surgical History:   Procedure Laterality Date    CATARACT REMOVAL WITH IMPLANT Bilateral 2018    DIAGNOSTIC CARDIAC CATH LAB PROCEDURE  11/2009     Family History   Problem Relation Age of Onset    No Known Problems Father     Diabetes Maternal Grandmother      Social History     Socioeconomic History    Marital status:      Spouse name: None    Number of children: None    Years of education: None    Highest education level: None   Occupational History    None   Social Needs    Financial resource strain: None    Food insecurity     Worry: None     Inability: None    Transportation needs     Medical: None     Non-medical: None   Tobacco Use    Smoking status: Never Smoker    Smokeless tobacco: Never Used   Substance and Sexual Activity    Alcohol use: Yes     Frequency: 2-4 times a month     Drinks per session: 1 or 2     Binge frequency: Never     Comment: WINE ONLY    Drug use: No    Sexual activity: Yes     Partners: Female   Lifestyle    Physical activity     Days per week: None     Minutes per session: None    Stress: None   Relationships    Social connections     Talks on phone: None     Gets together: None     Attends Religion service: None     Active member of club or organization: None     Attends meetings of clubs or organizations: None     Relationship status: None    Intimate partner violence     Fear of current or ex partner: None     Emotionally abused: None     Physically abused: None     Forced sexual activity: None   Other Topics Concern    None   Social History Narrative    None     Current Outpatient Medications   Medication Sig Dispense Refill    lisinopril (PRINIVIL;ZESTRIL) 5 MG tablet TAKE 1 TABLET DAILY 30 tablet 0    lisinopril (PRINIVIL;ZESTRIL) 5 MG tablet Take 1 tablet by mouth daily 90 tablet 3    metoprolol tartrate (LOPRESSOR) 25 MG tablet TAKE ONE-HALF (1/2) TABLET TWICE A DAY 90 tablet 4    metFORMIN (GLUCOPHAGE) 500 MG tablet TAKE 1 TABLET TWICE A DAY WITH MEALS 180 tablet 4    Handicap Placard MISC by Does not apply route Duration 1 year 1 each 0    aspirin 81 MG tablet Take 81 mg by mouth daily.  fluticasone (FLONASE) 50 MCG/ACT nasal spray 2 sprays by Each Nare route daily (Patient not taking: Reported on 8/7/2020) 1 Bottle 0     No current facility-administered medications for this visit. Current Outpatient Medications on File Prior to Visit   Medication Sig Dispense Refill    metoprolol tartrate (LOPRESSOR) 25 MG tablet TAKE ONE-HALF (1/2) TABLET TWICE A DAY 90 tablet 4    metFORMIN (GLUCOPHAGE) 500 MG tablet TAKE 1 TABLET TWICE A DAY WITH MEALS 180 tablet 4    Handicap Placard MISC by Does not apply route Duration 1 year 1 each 0    aspirin 81 MG tablet Take 81 mg by mouth daily.  fluticasone (FLONASE) 50 MCG/ACT nasal spray 2 sprays by Each Nare route daily (Patient not taking: Reported on 8/7/2020) 1 Bottle 0     No current facility-administered medications on file prior to visit.       Allergies   Allergen Reactions    Sulfamethoxazole-Trimethoprim Hives and Other (See Comments)     07/18/2005;SULFA, 07/18/2005;SULFA     Health Maintenance   Topic Date Due    Hepatitis C screen  1950    Lipid screen  03/14/2016    Potassium monitoring  08/31/2016    Creatinine monitoring  08/31/2016    Colon Cancer Screen FIT/FOBT  09/03/2016    Shingles Vaccine (2 of 3) 05/28/2018    Diabetic foot exam  05/03/2020    A1C test (Diabetic or Prediabetic)  05/03/2020    Diabetic microalbuminuria test  05/03/2020    Flu vaccine (1) 09/01/2020    Diabetic retinal exam  11/07/2021    DTaP/Tdap/Td vaccine (2 - Td) 12/30/2025    Pneumococcal 65+ years Vaccine  Completed    Hepatitis A vaccine  Aged Out    Hib vaccine  Aged Out    Meningococcal (ACWY) vaccine  Aged Out       Review of Systems     Review of Systems   Constitutional: Negative for activity change, appetite change, chills, fever and unexpected weight change. HENT: Negative. Eyes: Negative. Respiratory: Negative. Negative for shortness of breath. Cardiovascular: Negative. Negative for chest pain and palpitations. Gastrointestinal: Negative. Endocrine: Negative. Genitourinary: Negative. Musculoskeletal: Negative. Skin: Negative. Allergic/Immunologic: Negative. Neurological: Negative. Hematological: Negative. Psychiatric/Behavioral: Negative. Physical Exam  Vitals:    08/07/20 1037   BP: 126/85   Site: Left Upper Arm   Position: Sitting   Cuff Size: Large Adult   Pulse: 80   Temp: 97 °F (36.1 °C)   TempSrc: Temporal   SpO2: 97%   Weight: 255 lb (115.7 kg)   Height: 5' 10\" (1.778 m)       Physical Exam  Constitutional:       Appearance: He is well-developed. HENT:      Right Ear: External ear normal.      Left Ear: External ear normal.   Eyes:      Conjunctiva/sclera: Conjunctivae normal.      Pupils: Pupils are equal, round, and reactive to light. Neck:      Musculoskeletal: Normal range of motion and neck supple. Thyroid: No thyromegaly. Cardiovascular:      Rate and Rhythm: Normal rate and regular rhythm. Heart sounds: Normal heart sounds. No murmur. No friction rub. No gallop. Pulmonary:      Effort: Pulmonary effort is normal. No respiratory distress. Breath sounds: Normal breath sounds. No wheezing. Abdominal:      General: Bowel sounds are normal. There is no distension. Palpations: Abdomen is soft. There is no mass. Tenderness: There is no abdominal tenderness. There is no guarding or rebound. Hernia: No hernia is present. Genitourinary:     Penis: Normal.    Musculoskeletal: Normal range of motion. General: No tenderness. Lymphadenopathy:      Cervical: No cervical adenopathy. Skin:     General: Skin is warm and dry. Neurological:      Mental Status: He is alert and oriented to person, place, and time. Cranial Nerves: No cranial nerve deficit. Coordination: Coordination normal.         Assessment   Diagnosis Orders   1. Type 2 diabetes mellitus without complication, unspecified whether long term insulin use (HCC)  POCT glycosylated hemoglobin (Hb A1C)    CBC Auto Differential    Comprehensive Metabolic Panel    Lipid Panel    Microalbumin / Creatinine Urine Ratio   2. Colon cancer screening  Cologuard   3. Diabetes type 2, controlled ([address]  lisinopril (PRINIVIL;ZESTRIL) 5 MG tablet    DISCONTINUED: lisinopril (PRINIVIL;ZESTRIL) 5 MG tablet   4. Prostate cancer screening  Psa screening   5. Knee arthropathy  Amb External Referral To Orthopedic Surgery     Problem List     None          Plan  Orders Placed This Encounter   Procedures    Cologuard     This test is performed by an external laboratory and is used for result attachment only. Please fill out the appropriate paperwork required by the processing laboratory. See [email or web address] for further information.      Standing Status:   Future     Standing Expiration Date:   8/7/2021    CBC Auto Differential     Standing Status:   Future     Number of Occurrences:   1     Standing Expiration Date:   8/7/2021    Comprehensive Metabolic Panel     Standing Status:   Future     Number of Occurrences:   1     Standing Expiration Date:   8/7/2021    Lipid Panel     Standing Status:   Future     Number of Occurrences:   1     Standing Expiration Date:   8/7/2021     Order Specific Question:   Is Patient Fasting?/# of Hours     Answer:   8    Psa screening     Standing Status:   Future     Number of Occurrences:   1     Standing Expiration Date:   8/7/2021    Microalbumin / Creatinine Urine Ratio     Standing Status:   Future     Number of Occurrences:   1     Standing Expiration Date:   8/7/2021    Amb External Referral To Orthopedic Surgery     Referral Priority:   Routine     Referral Type:   Eval and Treat     Requested Specialty:   Orthopedic Surgery Number of Visits Requested:   1    POCT glycosylated hemoglobin (Hb A1C)     Orders Placed This Encounter   Medications    DISCONTD: lisinopril (PRINIVIL;ZESTRIL) 5 MG tablet     Sig: TAKE 1 TABLET DAILY     Dispense:  90 tablet     Refill:  3    lisinopril (PRINIVIL;ZESTRIL) 5 MG tablet     Sig: TAKE 1 TABLET DAILY     Dispense:  30 tablet     Refill:  0    lisinopril (PRINIVIL;ZESTRIL) 5 MG tablet     Sig: Take 1 tablet by mouth daily     Dispense:  90 tablet     Refill:  3     No follow-ups on file.   Gino Tamez MD

## 2020-08-25 ENCOUNTER — PATIENT MESSAGE (OUTPATIENT)
Dept: FAMILY MEDICINE CLINIC | Age: 70
End: 2020-08-25

## 2020-08-25 NOTE — TELEPHONE ENCOUNTER
Needs to make appointment to discuss narcotic treatment and needs to sigh a pain management contract

## 2020-09-01 ENCOUNTER — OFFICE VISIT (OUTPATIENT)
Dept: ORTHOPEDIC SURGERY | Age: 70
End: 2020-09-01
Payer: COMMERCIAL

## 2020-09-01 ENCOUNTER — HOSPITAL ENCOUNTER (OUTPATIENT)
Dept: GENERAL RADIOLOGY | Age: 70
Discharge: HOME | End: 2020-09-03
Payer: COMMERCIAL

## 2020-09-01 VITALS
HEIGHT: 70 IN | BODY MASS INDEX: 36.51 KG/M2 | HEART RATE: 73 BPM | OXYGEN SATURATION: 97 % | TEMPERATURE: 96.7 F | WEIGHT: 255 LBS

## 2020-09-01 PROBLEM — M17.9 ARTHRITIS OF KNEE, DEGENERATIVE: Status: ACTIVE | Noted: 2020-09-01

## 2020-09-01 PROCEDURE — 73562 X-RAY EXAM OF KNEE 3: CPT | Performed by: ORTHOPAEDIC SURGERY

## 2020-09-01 PROCEDURE — 73562 X-RAY EXAM OF KNEE 3: CPT

## 2020-09-01 PROCEDURE — 4040F PNEUMOC VAC/ADMIN/RCVD: CPT | Performed by: ORTHOPAEDIC SURGERY

## 2020-09-01 PROCEDURE — 3017F COLORECTAL CA SCREEN DOC REV: CPT | Performed by: ORTHOPAEDIC SURGERY

## 2020-09-01 PROCEDURE — G8427 DOCREV CUR MEDS BY ELIG CLIN: HCPCS | Performed by: ORTHOPAEDIC SURGERY

## 2020-09-01 PROCEDURE — G8417 CALC BMI ABV UP PARAM F/U: HCPCS | Performed by: ORTHOPAEDIC SURGERY

## 2020-09-01 PROCEDURE — 1036F TOBACCO NON-USER: CPT | Performed by: ORTHOPAEDIC SURGERY

## 2020-09-01 PROCEDURE — 1123F ACP DISCUSS/DSCN MKR DOCD: CPT | Performed by: ORTHOPAEDIC SURGERY

## 2020-09-01 PROCEDURE — 99203 OFFICE O/P NEW LOW 30 MIN: CPT | Performed by: ORTHOPAEDIC SURGERY

## 2020-09-01 ASSESSMENT — ENCOUNTER SYMPTOMS
SHORTNESS OF BREATH: 0
SORE THROAT: 0
ABDOMINAL PAIN: 0

## 2020-09-01 NOTE — PROGRESS NOTES
Subjective:      Patient ID: Alexis Chamberlain is a 77 y.o. male who presents today for:  Chief Complaint   Patient presents with    Knee Pain      pt states he has bilateral knee pain x several years, pt states he has been wearing knee braces, pt states he has done PT at home by himself, pt states this helped a small bit, pt states he would like to discuss surgery, no imaging on file       HPI patient comes in for evaluation of the right left knee pain. Has been going on for years. No history of any trauma. Pain increases with activity. Increases with weather changes. Patient comes in for evaluation.     Past Medical History:   Diagnosis Date    CAD (coronary artery disease)     [facility] mellitus [facility]     Hypertension     Screening for diabetic retinopathy     March 2014 outside  : Per member      Past Surgical History:   Procedure Laterality Date    CATARACT REMOVAL WITH IMPLANT Bilateral 2018    DIAGNOSTIC CARDIAC CATH LAB PROCEDURE  11/2009     Social History     Socioeconomic History    Marital status:      Spouse name: Not on file    Number of children: Not on file    Years of education: Not on file    Highest education level: Not on file   Occupational History    Not on file   Social Needs    Financial resource strain: Not on file    Food insecurity     Worry: Not on file     Inability: Not on file    Transportation needs     Medical: Not on file     Non-medical: Not on file   Tobacco Use    Smoking status: Never Smoker    Smokeless tobacco: Never Used   Substance and Sexual Activity    Alcohol use: Yes     Frequency: 2-4 times a month     Drinks per session: 1 or 2     Binge frequency: Never     Comment: WINE ONLY    Drug use: No    Sexual activity: Yes     Partners: Female   Lifestyle    Physical activity     Days per week: Not on file     Minutes per session: Not on file    Stress: Not on file   Relationships    Social connections     Talks on phone: Not on file     Gets together: Not on file     Attends Protestant service: Not on file     Active member of club or organization: Not on file     Attends meetings of clubs or organizations: Not on file     Relationship status: Not on file    Intimate partner violence     Fear of current or ex partner: Not on file     Emotionally abused: Not on file     Physically abused: Not on file     Forced sexual activity: Not on file   Other Topics Concern    Not on file   Social History Narrative    Not on file     Family History   Problem Relation Age of Onset    No Known Problems Father     Diabetes Maternal Grandmother      Allergies   Allergen Reactions    Sulfamethoxazole-Trimethoprim Hives and Other (See Comments)     07/18/2005;SULFA, 07/18/2005;SULFA     Current Outpatient Medications on File Prior to Visit   Medication Sig Dispense Refill    lisinopril (PRINIVIL;ZESTRIL) 5 MG tablet TAKE 1 TABLET DAILY 30 tablet 0    lisinopril (PRINIVIL;ZESTRIL) 5 MG tablet Take 1 tablet by mouth daily 90 tablet 3    metoprolol tartrate (LOPRESSOR) 25 MG tablet TAKE ONE-HALF (1/2) TABLET TWICE A DAY 90 tablet 4    metFORMIN (GLUCOPHAGE) 500 MG tablet TAKE 1 TABLET TWICE A DAY WITH MEALS 180 tablet 4    fluticasone (FLONASE) 50 MCG/ACT nasal spray 2 sprays by Each Nare route daily 1 Bottle 0    Handicap Placard MISC by Does not apply route Duration 1 year 1 each 0    aspirin 81 MG tablet Take 81 mg by mouth daily. No current facility-administered medications on file prior to visit. Review of Systems   Constitutional: Negative for fever. HENT: Negative for sore throat. Eyes: Negative for visual disturbance. Respiratory: Negative for shortness of breath. Cardiovascular: Negative for chest pain. Gastrointestinal: Negative for abdominal pain. Genitourinary: Negative for difficulty urinating. Skin: Negative for rash. Neurological: Negative for headaches. Hematological: Does not bruise/bleed easily. Objective:   Pulse 73   Temp 96.7 °F (35.9 °C) (Temporal)   Ht 5' 10\" (1.778 m)   Wt 255 lb (115.7 kg)   SpO2 97%   BMI 36.59 kg/m²     Ortho Exam  Pleasant oriented overweight white male wearing sleeves in the right left knee. Left knee shows pain on the medial and lateral joint line. Crepitus range of motion.  -3 degrees of terminal extension. He can flex to approximately 110. No ligamentous instability. No calf tenderness. Minimal varicosities in left lower extremity. No palpable cords posteriorly. Negative roll test left hip. Right knee shows no effusion no erythema no ecchymosis similar to the left knee. Pain on the medial and lateral joint line. Crepitus range of motion. Range of motion is from -3 degrees to 100 degrees of flexion. No ligamentous stability. No calf tenderness. Varicosities noted in the right lower extremity. No palpable cords posteriorly in the knee. Negative roll test the right hip. Radiographs and Laboratory Studies:     Diagnostic Imaging Studies:    Xr Knee Left (3 Views)    Result Date: 9/1/2020  . [id number] X-rays of the left knee AP lateral sunrise view show severe degenerative joint disease in all 3 compartments with spurs noted in all 3 compartments loss of joint space. No acute fracture dislocation noted. Some calcification posteriorly is noted in the vessels. Xr Knee Right (3 Views)    Result Date: 9/1/2020  X-rays AP lateral and sunrise view of the right knee that we obtained show severe degenerative joint disease in all 3 compartments with spurs noted throughout. Loss of joint space. No fractures or dislocations. Dictated by Dr. Nigel Zaldivar      Laboratory Studies:   Lab Results   Component Value Date    WBC 7.1 08/07/2020    HGB 14.2 08/07/2020    HCT 43.3 08/07/2020    MCV 87.6 08/07/2020     08/07/2020     No results found for: [address]  No results found for: CRP    Assessment:      Diagnosis Orders   1.  Chronic pain of both knees  XR KNEE RIGHT (3 VIEWS)    XR KNEE LEFT (3 VIEWS)   2. Primary osteoarthritis of both knees            Plan:     Impression is severe degenerative joint disease right left knee. Plan at this time discussed upon with the patient do the amount of arthritis in his knees I am not sure that short of a knee replacement conservative measures will help him. At this point he agrees with the plan. We will get him an appointment with 1 of our total joint doctors this week. Any problems questions or difficulties prior to that he is notify us. But discussed taking anti-inflammatories such as Celebrex, Mobic, or diclofenac. He will okay that with his cardiac doctor before we start him on that. Orders Placed This Encounter   Procedures    XR KNEE RIGHT (3 VIEWS)     Standing Status:   Future     Number of Occurrences:   1     Standing Expiration Date:   9/1/2021    XR KNEE LEFT (3 VIEWS)     Standing Status:   Future     Number of Occurrences:   1     Standing Expiration Date:   10/1/2020     No orders of the defined types were placed in this encounter. No follow-ups on file.       La Nena Oden MD

## 2020-09-08 ENCOUNTER — OFFICE VISIT (OUTPATIENT)
Dept: ORTHOPEDIC SURGERY | Age: 70
End: 2020-09-08
Payer: COMMERCIAL

## 2020-09-08 VITALS
WEIGHT: 255 LBS | HEIGHT: 70 IN | OXYGEN SATURATION: 96 % | TEMPERATURE: 96.9 F | HEART RATE: 90 BPM | BODY MASS INDEX: 36.51 KG/M2

## 2020-09-08 PROBLEM — M17.0 PRIMARY OSTEOARTHRITIS OF BOTH KNEES: Status: ACTIVE | Noted: 2020-09-08

## 2020-09-08 PROCEDURE — 99214 OFFICE O/P EST MOD 30 MIN: CPT | Performed by: ORTHOPAEDIC SURGERY

## 2020-09-08 RX ORDER — TRAMADOL HYDROCHLORIDE 50 MG/1
50 TABLET ORAL EVERY 6 HOURS PRN
Qty: 28 TABLET | Refills: 0 | Status: SHIPPED | OUTPATIENT
Start: 2020-09-08 | End: 2020-09-15

## 2020-09-08 NOTE — PROGRESS NOTES
Not on file     Active member of club or organization: Not on file     Attends meetings of clubs or organizations: Not on file     Relationship status: Not on file    Intimate partner violence     Fear of current or ex partner: Not on file     Emotionally abused: Not on file     Physically abused: Not on file     Forced sexual activity: Not on file   Other Topics Concern    Not on file   Social History Narrative    Not on file     Family History   Problem Relation Age of Onset    No Known Problems Father     Diabetes Maternal Grandmother      Allergies   Allergen Reactions    Sulfamethoxazole-Trimethoprim Hives and Other (See Comments)     07/18/2005;SULFA, 07/18/2005;SULFA     Current Outpatient Medications on File Prior to Visit   Medication Sig Dispense Refill    lisinopril (PRINIVIL;ZESTRIL) 5 MG tablet TAKE 1 TABLET DAILY 30 tablet 0    lisinopril (PRINIVIL;ZESTRIL) 5 MG tablet Take 1 tablet by mouth daily 90 tablet 3    metoprolol tartrate (LOPRESSOR) 25 MG tablet TAKE ONE-HALF (1/2) TABLET TWICE A DAY 90 tablet 4    metFORMIN (GLUCOPHAGE) 500 MG tablet TAKE 1 TABLET TWICE A DAY WITH MEALS 180 tablet 4    fluticasone (FLONASE) 50 MCG/ACT nasal spray 2 sprays by Each Nare route daily 1 Bottle 0    Handicap Placard MISC by Does not apply route Duration 1 year 1 each 0    aspirin 81 MG tablet Take 81 mg by mouth daily. No current facility-administered medications on file prior to visit. Controlled Substance Monitoring:    Acute and Chronic Pain Monitoring:   No flowsheet data found.       Review of Systems    Objective:   Pulse 90   Temp 96.9 °F (36.1 °C) (Temporal)   Ht 5' 10\" (1.778 m)   Wt 255 lb (115.7 kg)   SpO2 96%   BMI 36.59 kg/m²     Physical Exam:  Mr. Shelton Umaña mobilizes with a bilateral antalgic gait with varus deformities in both knees  Tenderness present along the medial joint line the right knee less tenderness on the left knee  Moderate effusion in the right knee and a trace effusion on the left  Range of motion right knee is from 10 degrees to 120 degrees  Left knee is from 5 degrees to about 120 degrees  Crepitations felt throughout the range of motion  Ligament stable on varus valgus stresses  Muscles are soft  Incision and circulation are intact distally    Diagnostic Imaging:    Diagnostic studies x-rays of the right and the left knee AP lateral patella skyline views    Right knee show severe degenerative changes in the medial compartment as well as the patellofemoral joint. Significant loss of joint space and sclerosis seen in the tibial plateau on the femoral condyle  Left knee shows moderate to severe degenerative changes in the lateral and medial compartment. Clear femoral degeneration is also noted        Assessment:       Diagnosis Orders   1. Primary osteoarthritis of both knees           Plan:        Mr. Mendel Cruz has tried all conservative measures in the past  With injections and Visco supplementation have not helped his knees  He therefore discussed surgical intervention the form of total knee replacement  Surgical procedure was explained in detail as well as the postoperative rehab and therapy  Risks and benefits of surgery were also discussed  Risk of anesthesia, injury to the vessel, nerve, fractures  Postoperative complications such as hematoma formation, infection, wound healing problems, failure of the extensor expansion, medical complications, DVT, PE have all been discussed  After careful consideration of all the options the patient consents for surgery  He will require preoperative evaluation prior to the surgery  No orders of the defined types were placed in this encounter. No orders of the defined types were placed in this encounter. Return Plan for total knee replacement on the right side.     The patient will return sometime in January to have the surgery in March  This is to reduce weight prior to the surgery  Dominga Hui MD

## 2020-09-08 NOTE — PATIENT INSTRUCTIONS
Patient Education        Total Knee Replacement: What to Expect at the Hospital  What care can you expect in the hospital?     After knee replacement surgery, you will be taken to the intensive care unit or recovery room. In a few hours, you will go to your hospital room. You may see a metal triangle called a trapeze over your bed. You can use this to help move yourself around in bed. You will be very tired and will want to rest. Your nurse may also help turn you as you rest.  You will probably still have a tube that drains urine from your bladder (urinary catheter). And you will be getting fluids into your vein through an IV tube. You may also have a tube called a drain near the cut (incision) in your knee. You may not feel hungry. You may feel sick to your stomach or constipated for a couple of days. This is common. Your nurse may give you stool softeners or laxatives to help with constipation. You may have stockings that put pressure on your legs to prevent blood clots. Your nurse will also give you medicine and exercise instructions to help prevent clots. Most people get out of bed with help on the day of surgery or the next day. Your doctor will let you know if you will stay in the hospital or if you can go home the day of surgery. This care sheet gives you a general idea about how your recovery will begin in the hospital. Each person has a different experience and recovers at a different pace. What will happen in the hospital?  Pain and pain medicine  · You will receive medicine to help control pain. Some pain medicines are given through an IV. Others are taken by mouth. · Take it as needed, and remember that it is easier to prevent pain before it starts than to stop it after it has started. · If you are still in pain after you take your medicine, tell your nurse. You may need new medicine or to get the medicine in a different form.   Other medicines  · Your doctor will tell you if and when you can restart your medicines. He or she will also give you instructions about taking any new medicines. · If you take aspirin or some other blood thinner, ask your doctor if and when to start taking it again. Make sure that you understand exactly what your doctor wants you to do. · Your doctor will probably give you blood thinners to prevent blood clots in your leg. You take this medicine during your hospital stay and when you go home. Rehabilitation  · Your rehabilitation (rehab) will probably begin the day of your surgery. Your physical therapist will get you started. It may be painful to exercise at first, but your nurse will give you pain medicine if you need it. · Your physical therapist will help you walk, go up and down stairs, and get in and out of bed and chairs. He or she will help improve the movement (range of motion) and strength in your knee. You will learn positions and motions that will help prevent your knee from popping out (dislocating). This is a very important part of your therapy. · How quickly you regain strength and motion and do things on your own depends on how well you follow your physical therapy. Your physical therapist will teach you the exercises, but you must do them yourself. · An occupational therapist will work with you. He or she will teach you how to bathe, dress, and do daily activities. You may need tools to help with everyday activities. Tools include shower stools, shoehorns, and long-handled sponges. Diet  · You will get liquids at first, but you can start to eat your normal diet when you feel like it. If your stomach is upset, your nurse will probably bring you bland, low-fat foods like plain rice, broiled chicken, toast, and yogurt. · You may have more fiber added to your meals to prevent constipation. Incision care  · You will have a bandage over your incision. Your nurse will care for this.   Other instructions  · Your nurse or respiratory therapist will have you do breathing and coughing exercises to prevent problems such as pneumonia. Breathe in deeply through your nose, and slowly breathe out through your mouth. Do this 3 times, and then cough 2 times. · You may have a device (incentive spirometer) that you suck air through to help keep your lungs healthy. Use this as your nurse or therapist tells you to. Follow-up care is a key part of your treatment and safety. Be sure to make and go to all appointments, and call your doctor if you are having problems. It's also a good idea to know your test results and keep a list of the medicines you take. When should you call for help? · You have severe trouble breathing. · You have a cough, shortness of breath, or chest pain. · You are sick to your stomach or cannot keep fluids down. · You have signs of a blood clot, such as:  ? Pain in your calf, back of the knee, thigh, or groin. ? Redness and swelling in your leg or groin. · You are in pain or your pain does not get better after you take pain medicine. · Bright red blood has soaked through the bandage over your incision. · You have signs of infection, such as:  ? Increased pain, swelling, warmth, or redness. ? Red streaks leading from the incision. ? Pus draining from the incision. ? A fever. Where can you learn more? Go to https://[email or web address]. org and sign in to your [employer] account. Enter H604 in the [facility] box to learn more about \"Total Knee Replacement: What to Expect at the Hospital.\"     If you do not have an account, please click on the \"Sign Up Now\" link. Current as of: March 2, 2020               Content Version: 12.5  © 0837-4537 Healthwise, Incorporated. Care instructions adapted under license by [facility] ([facility]). If you have questions about a medical condition or this instruction, always ask your healthcare professional. [address] any warranty or liability for your use of this information.

## 2020-09-09 ENCOUNTER — PATIENT MESSAGE (OUTPATIENT)
Dept: FAMILY MEDICINE CLINIC | Age: 70
End: 2020-09-09

## 2020-09-10 NOTE — TELEPHONE ENCOUNTER
Micro albumin will be elevated due to kidney's under stress from dm and htn need to control both your kidney function is normal. Best treatment is the ace lisinopril that you are already taking.

## 2020-09-11 RX ORDER — LISINOPRIL 5 MG/1
5 TABLET ORAL 2 TIMES DAILY
Qty: 180 TABLET | Refills: 3 | Status: SHIPPED | OUTPATIENT
Start: 2020-09-11 | End: 2020-10-14 | Stop reason: SDUPTHER

## 2020-09-14 ENCOUNTER — PATIENT MESSAGE (OUTPATIENT)
Dept: ORTHOPEDIC SURGERY | Age: 70
End: 2020-09-14

## 2020-12-08 ENCOUNTER — NURSE ONLY (OUTPATIENT)
Dept: FAMILY MEDICINE CLINIC | Age: 70
End: 2020-12-08
Payer: COMMERCIAL

## 2020-12-08 PROCEDURE — 90694 VACC AIIV4 NO PRSRV 0.5ML IM: CPT | Performed by: FAMILY MEDICINE

## 2020-12-08 PROCEDURE — 90471 IMMUNIZATION ADMIN: CPT | Performed by: FAMILY MEDICINE

## 2020-12-08 NOTE — PROGRESS NOTES
Vaccine Information Sheet, \"Influenza - Inactivated\"  given to Caryl Hernandez, or parent/legal guardian of  aCryl Hernandez and verbalized understanding. Patient responses:    Have you ever had a reaction to a flu vaccine? No  Are you able to eat eggs without adverse effects? Yes  Do you have any current illness? No  Have you ever had [place] Syndrome? No    Flu vaccine given per order. Please see immunization tab.

## 2021-01-17 DIAGNOSIS — E11.9 DIABETES TYPE 2, CONTROLLED (HCC): ICD-10-CM

## 2021-04-16 ENCOUNTER — PATIENT MESSAGE (OUTPATIENT)
Dept: FAMILY MEDICINE CLINIC | Age: 71
End: 2021-04-16

## 2021-04-16 DIAGNOSIS — I25.118 CORONARY ARTERY DISEASE INVOLVING NATIVE HEART WITH OTHER FORM OF ANGINA PECTORIS, UNSPECIFIED VESSEL OR LESION TYPE (HCC): Primary | ICD-10-CM

## 2021-04-16 DIAGNOSIS — Z95.5 PRESENCE OF STENT IN ANTERIOR DESCENDING BRANCH OF LEFT CORONARY ARTERY: ICD-10-CM

## 2021-04-16 NOTE — TELEPHONE ENCOUNTER
From: Kortney Bray  To: Irving Bentley MD  Sent: 4/16/2021 2:55 PM EDT  Subject: Visit Carrie Asif,      I noticed my cardiology doctor. Rubén THOMPSON Dion Rios is not receiving messages, maybe he is no longer with [address]? Anyway, if he isn't I need a referral to another cardiologist. thanks you    PS I had my 2nd [place] Virus vaccine last Saturday and experienced some small side effects, but nothing to major. Little lethargic/tired/aching/ in the joints. Also does [facility] have an emergency department?

## 2021-05-27 ENCOUNTER — OFFICE VISIT (OUTPATIENT)
Dept: CARDIOLOGY CLINIC | Age: 71
End: 2021-05-27
Payer: COMMERCIAL

## 2021-05-27 VITALS
HEIGHT: 70 IN | WEIGHT: 250 LBS | SYSTOLIC BLOOD PRESSURE: 131 MMHG | DIASTOLIC BLOOD PRESSURE: 72 MMHG | OXYGEN SATURATION: 99 % | HEART RATE: 76 BPM | RESPIRATION RATE: 18 BRPM | BODY MASS INDEX: 35.79 KG/M2

## 2021-05-27 DIAGNOSIS — E78.5 DYSLIPIDEMIA: ICD-10-CM

## 2021-05-27 DIAGNOSIS — I10 ESSENTIAL HYPERTENSION: ICD-10-CM

## 2021-05-27 DIAGNOSIS — I25.118 CORONARY ARTERY DISEASE INVOLVING NATIVE HEART WITH OTHER FORM OF ANGINA PECTORIS, UNSPECIFIED VESSEL OR LESION TYPE (HCC): Primary | ICD-10-CM

## 2021-05-27 PROCEDURE — 99214 OFFICE O/P EST MOD 30 MIN: CPT | Performed by: INTERNAL MEDICINE

## 2021-05-27 PROCEDURE — 93000 ELECTROCARDIOGRAM COMPLETE: CPT | Performed by: INTERNAL MEDICINE

## 2021-05-27 RX ORDER — ASPIRIN 81 MG/1
81 TABLET ORAL DAILY
Qty: 90 TABLET | Refills: 1
Start: 2021-05-27

## 2021-05-27 ASSESSMENT — ENCOUNTER SYMPTOMS
EYES NEGATIVE: 1
CHEST TIGHTNESS: 0
STRIDOR: 0
GASTROINTESTINAL NEGATIVE: 1
RESPIRATORY NEGATIVE: 1
SHORTNESS OF BREATH: 0
WHEEZING: 0
COUGH: 0
NAUSEA: 0
BLOOD IN STOOL: 0

## 2021-05-27 NOTE — PROGRESS NOTES
Subsequent Progress Note  Patient: Stephanie Jimenez  YOB: 1950  MRN: [id number]    Chief Complaint: CAD HTN HPL  Chief Complaint   Patient presents with    Follow-up     LOV 6/5/2019    Coronary Artery Disease   RC    CV Data:  11/2009 LAD 3 JUAN ccf. Subjective/HPI: denies cp nor sob pt declines Statin     Nonsmoker  No etoh  Retired- [employer]     EKG:    Past Medical History:   Diagnosis Date    CAD (coronary artery disease)     Diabetes mellitus ([facility]     Hypertension     Screening for diabetic retinopathy     March 2014 outside  : Per member       Past Surgical History:   Procedure Laterality Date    CATARACT REMOVAL WITH IMPLANT Bilateral 2018    DIAGNOSTIC CARDIAC CATH LAB PROCEDURE  11/2009       Family History   Problem Relation Age of Onset    No Known Problems Father     Diabetes Maternal Grandmother        Social History     Socioeconomic History    Marital status:      Spouse name: None    Number of children: None    Years of education: None    Highest education level: None   Occupational History    None   Tobacco Use    Smoking status: Never Smoker    Smokeless tobacco: Never Used   Substance and Sexual Activity    Alcohol use: Yes     Comment: WINE ONLY    Drug use: No    Sexual activity: Yes     Partners: Female   Other Topics Concern    None   Social History Narrative    None     Social Determinants of Health     Financial Resource Strain:     Difficulty of Paying Living Expenses:    Food Insecurity:     Worried About Running Out of Food in the Last Year:     Ran Out of Food in the Last Year:    Transportation Needs:     Lack of Transportation (Medical):      Lack of Transportation (Non-Medical):    Physical Activity:     Days of Exercise per Week:     Minutes of Exercise per Session:    Stress:     Feeling of Stress :    Social Connections:     Frequency of Communication with Friends and Family:     Frequency of Social Gatherings with Friends and and Atraumatic   Eyes: Pupils are equal, round, and reactive to light. Neck: Thyroid normal. No JVD present. No neck adenopathy. No thyromegaly present. Cardiovascular: Normal rate, regular rhythm, normal heart sounds, intact distal pulses and normal pulses. Pulmonary/Chest: Effort normal and breath sounds normal. He has no wheezes. He has no rales. He exhibits no tenderness. Abdominal: Soft. Bowel sounds are normal. There is no abdominal tenderness. Musculoskeletal:         General: No tenderness or edema. Normal range of motion. Cervical back: Normal range of motion and neck supple. Neurological: He is alert and oriented to person, place, and time. Skin: Skin is warm. No cyanosis. Nails show no clubbing.        LABS:  CBC:   Lab Results   Component Value Date    WBC 7.1 08/07/2020    RBC 4.94 08/07/2020    HGB 14.2 08/07/2020    HCT 43.3 08/07/2020    MCV 87.6 08/07/2020    MCH 28.8 08/07/2020    MCHC 32.9 08/07/2020    RDW 13.3 08/07/2020     08/07/2020     Lipids:  Lab Results   Component Value Date    CHOL 251 (H) 08/07/2020    CHOL 154 03/14/2015    CHOL 139 03/21/2014     Lab Results   Component Value Date    TRIG 320 (H) 08/07/2020    TRIG 127 03/14/2015    TRIG 104 03/21/2014     Lab Results   Component Value Date    HDL 40 08/07/2020    HDL 45 03/14/2015    HDL 33 (L) 03/21/2014     Lab Results   Component Value Date    LDLCALC 147 (H) 08/07/2020    LDLCALC 84 03/14/2015    LDLCALC 85 03/21/2014     No results found for: LABVLDL, VLDL  No results found for: CHOLHDLRATIO  CMP:    Lab Results   Component Value Date     08/07/2020    K 4.5 08/07/2020     08/07/2020    CO2 25 08/07/2020    BUN 20 08/07/2020    CREATININE 0.79 08/07/2020    GFRAA >60.0 08/07/2020    LABGLOM >60.0 08/07/2020    GLUCOSE 124 08/07/2020    PROT 7.5 08/07/2020    LABALBU 4.1 08/07/2020    CALCIUM 9.6 08/07/2020    BILITOT 0.6 08/07/2020    ALKPHOS 70 08/07/2020    AST 20 08/07/2020    ALT 27 08/07/2020     BMP:    Lab Results   Component Value Date     08/07/2020    K 4.5 08/07/2020     08/07/2020    CO2 25 08/07/2020    BUN 20 08/07/2020    LABALBU 4.1 08/07/2020    CREATININE 0.79 08/07/2020    CALCIUM 9.6 08/07/2020    GFRAA >60.0 08/07/2020    LABGLOM >60.0 08/07/2020    GLUCOSE 124 08/07/2020     Magnesium:  No results found for: MG  TSH:No results found for: TSHFT4, TSH    Patient Active Problem List   Diagnosis    Hx of vasectomy    CAD (coronary artery disease)    History of tobacco use    Presence of stent in anterior descending branch of left coronary artery    [facility]    Knee pain    Encounter for physical therapy    [facility]    Arthritis of knee, degenerative    Primary osteoarthritis of both knees       Medications Discontinued During This Encounter   Medication Reason    aspirin 81 MG tablet LIST CLEANUP    lisinopril (PRINIVIL;ZESTRIL) 5 MG tablet DUPLICATE    fluticasone (FLONASE) 50 MCG/ACT nasal spray LIST CLEANUP       Modified Medications    No medications on file       Orders Placed This Encounter   Medications    aspirin EC 81 MG EC tablet     Sig: Take 1 tablet by mouth daily     Dispense:  90 tablet     Refill:  1       Assessment/Plan:    1. Coronary artery disease involving native heart with other form of angina pectoris, unspecified vessel or lesion type ([facility]   no angina. Pt not taking asa. He will resume   - EKG 12 Lead    2. Dyslipidemia  Declines statins- he went on Plant based diet    3. Essential hypertension   stable    declines any tests due to insurance        Counseling:  Heart Healthy Lifestyle, Low Salt Diet, Take Precautions to Prevent Falls and Walk Daily    Return in about 6 months (around 11/27/2021).       Electronically signed by Asael Chen MD on 5/27/2021 at 3:14 PM

## 2021-09-11 ENCOUNTER — PATIENT MESSAGE (OUTPATIENT)
Dept: FAMILY MEDICINE CLINIC | Age: 71
End: 2021-09-11

## 2021-09-11 DIAGNOSIS — I25.10 CORONARY ARTERY DISEASE INVOLVING NATIVE CORONARY ARTERY OF NATIVE HEART WITHOUT ANGINA PECTORIS: ICD-10-CM

## 2021-09-16 DIAGNOSIS — I25.10 CORONARY ARTERY DISEASE INVOLVING NATIVE CORONARY ARTERY OF NATIVE HEART WITHOUT ANGINA PECTORIS: ICD-10-CM

## 2021-10-26 ENCOUNTER — IMMUNIZATION (OUTPATIENT)
Dept: FAMILY MEDICINE CLINIC | Age: 71
End: 2021-10-26
Payer: COMMERCIAL

## 2021-10-26 PROCEDURE — 91301 COVID-19, MODERNA VACCINE 100MCG/0.5ML DOSE: CPT | Performed by: FAMILY MEDICINE

## 2021-10-26 PROCEDURE — 0013A COVID-19, MODERNA VACCINE 100MCG/0.5ML DOSE: CPT | Performed by: FAMILY MEDICINE

## 2021-11-23 ENCOUNTER — OFFICE VISIT (OUTPATIENT)
Dept: FAMILY MEDICINE CLINIC | Age: 71
End: 2021-11-23
Payer: COMMERCIAL

## 2021-11-23 VITALS
RESPIRATION RATE: 18 BRPM | HEART RATE: 64 BPM | TEMPERATURE: 96.9 F | WEIGHT: 225 LBS | BODY MASS INDEX: 32.21 KG/M2 | DIASTOLIC BLOOD PRESSURE: 80 MMHG | HEIGHT: 70 IN | OXYGEN SATURATION: 98 % | SYSTOLIC BLOOD PRESSURE: 110 MMHG

## 2021-11-23 DIAGNOSIS — E11.69 TYPE 2 DIABETES MELLITUS WITH HYPERLIPIDEMIA (HCC): ICD-10-CM

## 2021-11-23 DIAGNOSIS — I25.10 CORONARY ARTERY DISEASE INVOLVING NATIVE CORONARY ARTERY OF NATIVE HEART WITHOUT ANGINA PECTORIS: ICD-10-CM

## 2021-11-23 DIAGNOSIS — E78.5 TYPE 2 DIABETES MELLITUS WITH HYPERLIPIDEMIA (HCC): ICD-10-CM

## 2021-11-23 DIAGNOSIS — Z12.5 SCREENING FOR PROSTATE CANCER: ICD-10-CM

## 2021-11-23 DIAGNOSIS — S39.012A STRAIN OF LUMBAR REGION, INITIAL ENCOUNTER: Primary | ICD-10-CM

## 2021-11-23 LAB
ALBUMIN SERPL-MCNC: 4.2 G/DL (ref 3.5–4.6)
ALP BLD-CCNC: 76 U/L (ref 35–104)
ALT SERPL-CCNC: 20 U/L (ref 0–41)
ANION GAP SERPL CALCULATED.3IONS-SCNC: 15 MEQ/L (ref 9–15)
AST SERPL-CCNC: 20 U/L (ref 0–40)
BASOPHILS ABSOLUTE: 0 K/UL (ref 0–0.2)
BASOPHILS RELATIVE PERCENT: 0.6 %
BILIRUB SERPL-MCNC: 0.4 MG/DL (ref 0.2–0.7)
BUN BLDV-MCNC: 21 MG/DL (ref 8–23)
CALCIUM SERPL-MCNC: 9.6 MG/DL (ref 8.5–9.9)
CHLORIDE BLD-SCNC: 100 MEQ/L (ref 95–107)
CHOLESTEROL, TOTAL: 242 MG/DL (ref 0–199)
CO2: 24 MEQ/L (ref 20–31)
CREAT SERPL-MCNC: 0.83 MG/DL (ref 0.7–1.2)
CREATININE URINE: 219.5 MG/DL
EOSINOPHILS ABSOLUTE: 0.2 K/UL (ref 0–0.7)
EOSINOPHILS RELATIVE PERCENT: 3.2 %
GFR AFRICAN AMERICAN: >60
GFR NON-AFRICAN AMERICAN: >60
GLOBULIN: 3.1 G/DL (ref 2.3–3.5)
GLUCOSE BLD-MCNC: 80 MG/DL (ref 70–99)
HBA1C MFR BLD: 5.7 %
HCT VFR BLD CALC: 41.8 % (ref 42–52)
HDLC SERPL-MCNC: 41 MG/DL (ref 40–59)
HEMOGLOBIN: 13.8 G/DL (ref 14–18)
LDL CHOLESTEROL CALCULATED: ABNORMAL MG/DL (ref 0–129)
LYMPHOCYTES ABSOLUTE: 1.7 K/UL (ref 1–4.8)
LYMPHOCYTES RELATIVE PERCENT: 24.8 %
MCH RBC QN AUTO: 29.2 PG (ref 27–31.3)
MCHC RBC AUTO-ENTMCNC: 33.1 % (ref 33–37)
MCV RBC AUTO: 88.2 FL (ref 80–100)
MICROALBUMIN UR-MCNC: 145.1 MG/DL
MICROALBUMIN/CREAT UR-RTO: 661 MG/G (ref 0–30)
MONOCYTES ABSOLUTE: 0.8 K/UL (ref 0.2–0.8)
MONOCYTES RELATIVE PERCENT: 12.4 %
NEUTROPHILS ABSOLUTE: 4 K/UL (ref 1.4–6.5)
NEUTROPHILS RELATIVE PERCENT: 59 %
PDW BLD-RTO: 13.6 % (ref 11.5–14.5)
PLATELET # BLD: 302 K/UL (ref 130–400)
POTASSIUM SERPL-SCNC: 4.9 MEQ/L (ref 3.4–4.9)
PROSTATE SPECIFIC ANTIGEN: 0.91 NG/ML (ref 0–4)
RBC # BLD: 4.74 M/UL (ref 4.7–6.1)
SODIUM BLD-SCNC: 139 MEQ/L (ref 135–144)
TOTAL PROTEIN: 7.3 G/DL (ref 6.3–8)
TRIGL SERPL-MCNC: 433 MG/DL (ref 0–150)
WBC # BLD: 6.8 K/UL (ref 4.8–10.8)

## 2021-11-23 PROCEDURE — 99214 OFFICE O/P EST MOD 30 MIN: CPT | Performed by: NURSE PRACTITIONER

## 2021-11-23 PROCEDURE — 83036 HEMOGLOBIN GLYCOSYLATED A1C: CPT | Performed by: NURSE PRACTITIONER

## 2021-11-23 RX ORDER — TIZANIDINE 4 MG/1
4 TABLET ORAL 3 TIMES DAILY PRN
Qty: 30 TABLET | Refills: 0 | Status: SHIPPED | OUTPATIENT
Start: 2021-11-23 | End: 2021-11-29 | Stop reason: SDUPTHER

## 2021-11-23 RX ORDER — METHYLPREDNISOLONE 4 MG/1
TABLET ORAL
Qty: 1 KIT | Refills: 0 | Status: SHIPPED | OUTPATIENT
Start: 2021-11-23 | End: 2021-12-08

## 2021-11-23 SDOH — ECONOMIC STABILITY: FOOD INSECURITY: WITHIN THE PAST 12 MONTHS, YOU WORRIED THAT YOUR FOOD WOULD RUN OUT BEFORE YOU GOT MONEY TO BUY MORE.: NEVER TRUE

## 2021-11-23 SDOH — ECONOMIC STABILITY: FOOD INSECURITY: WITHIN THE PAST 12 MONTHS, THE FOOD YOU BOUGHT JUST DIDN'T LAST AND YOU DIDN'T HAVE MONEY TO GET MORE.: NEVER TRUE

## 2021-11-23 ASSESSMENT — ENCOUNTER SYMPTOMS
ABDOMINAL PAIN: 0
SHORTNESS OF BREATH: 0
BOWEL INCONTINENCE: 0
GASTROINTESTINAL NEGATIVE: 1
ALLERGIC/IMMUNOLOGIC NEGATIVE: 1
RESPIRATORY NEGATIVE: 1
EYES NEGATIVE: 1
BACK PAIN: 1

## 2021-11-23 ASSESSMENT — PATIENT HEALTH QUESTIONNAIRE - PHQ9
2. FEELING DOWN, DEPRESSED OR HOPELESS: 0
1. LITTLE INTEREST OR PLEASURE IN DOING THINGS: 0
SUM OF ALL RESPONSES TO PHQ QUESTIONS 1-9: 0
SUM OF ALL RESPONSES TO PHQ9 QUESTIONS 1 & 2: 0

## 2021-11-23 ASSESSMENT — SOCIAL DETERMINANTS OF HEALTH (SDOH): HOW HARD IS IT FOR YOU TO PAY FOR THE VERY BASICS LIKE FOOD, HOUSING, MEDICAL CARE, AND HEATING?: NOT HARD AT ALL

## 2021-11-23 NOTE — PROGRESS NOTES
Subjective:     Patient ID: Leann Alan is a 80 y.o. male who presentstoday for:  Chief Complaint   Patient presents with    Back Pain     Pt. is here for lower back pain X 2 months. Pt. states he has a little trampoline at home and bounces on it and thinks that may have did it. He isn't sure if he needs to see a Chiropractor, he saw one 20 years ago and it worked. Pt. has been taking 3- 500 MG Tylenol with mild relief. Pt. states when he is sitting in a hard chair he is fine but if he's sitting in a lazy boy chair he can barely take a step getting up. Back Pain  This is a new problem. The current episode started more than 1 month ago. The problem occurs daily. The problem has been waxing and waning since onset. The pain is present in the lumbar spine. The quality of the pain is described as aching. The pain does not radiate. The pain is moderate. Pertinent negatives include no abdominal pain, bladder incontinence, bowel incontinence, chest pain, dysuria, fever, headaches, leg pain, numbness, paresis, paresthesias, pelvic pain, perianal numbness, tingling, weakness or weight loss. He has tried ice, NSAIDs and heat for the symptoms. The treatment provided mild relief.        Past Medical History:   Diagnosis Date    CAD (coronary artery disease)     [facility] mellitus [facility]     Hypertension     Screening for diabetic retinopathy     March 2014 outside  : Per member     Current Outpatient Medications on File Prior to Visit   Medication Sig Dispense Refill    Handicap Placard MISC by Does not apply route Good for five years 1 each 0    metoprolol tartrate (LOPRESSOR) 25 MG tablet TAKE ONE-HALF (1/2) TABLET TWICE A DAY 10 tablet 0    aspirin EC 81 MG EC tablet Take 1 tablet by mouth daily 90 tablet 1    metFORMIN (GLUCOPHAGE) 500 MG tablet TAKE 1 TABLET TWICE A DAY WITH MEALS 180 tablet 3    lisinopril (PRINIVIL;ZESTRIL) 5 MG tablet Take 1 tablet by mouth 2 times daily 180 tablet 3    Handicap Placard MISC by Does not apply route Duration 1 year 1 each 0     No current facility-administered medications on file prior to visit. Past Surgical History:   Procedure Laterality Date    CATARACT REMOVAL WITH IMPLANT Bilateral 2018    DIAGNOSTIC CARDIAC CATH LAB PROCEDURE  11/2009        Family History   Problem Relation Age of Onset    No Known Problems Father     Diabetes Maternal Grandmother      Social History     Socioeconomic History    Marital status:      Spouse name: Not on file    Number of children: Not on file    Years of education: Not on file    Highest education level: Not on file   Occupational History    Not on file   Tobacco Use    Smoking status: Never Smoker    Smokeless tobacco: Never Used   Substance and Sexual Activity    Alcohol use: Yes     Comment: WINE ONLY    Drug use: No    Sexual activity: Yes     Partners: Female   Other Topics Concern    Not on file   Social History Narrative    Not on file     Social Determinants of Health     Financial Resource Strain: Low Risk     Difficulty of Paying Living Expenses: Not hard at all   Food Insecurity: No Food Insecurity    Worried About Running Out of Food in the Last Year: Never true    [address] in the Last Year: Never true   Transportation Needs:     Lack of Transportation (Medical): Not on file    Lack of Transportation (Non-Medical):  Not on file   Physical Activity:     Days of Exercise per Week: Not on file    Minutes of Exercise per Session: Not on file   Stress:     Feeling of Stress : Not on file   Social Connections:     Frequency of Communication with Friends and Family: Not on file    Frequency of Social Gatherings with Friends and Family: Not on file    Attends Islam Services: Not on file    Active Member of Clubs or Organizations: Not on file    Attends Club or Organization Meetings: Not on file    Marital Status: Not on file   Intimate Partner Violence:     Fear of Current or Ex-Partner: Not on file    Emotionally Abused: Not on file    Physically Abused: Not on file    Sexually Abused: Not on file   Housing Stability:     Unable to Pay for Housing in the Last Year: Not on file    Number of Places Lived in the Last Year: Not on file    Unstable Housing in the Last Year: Not on file     Allergies:  Sulfamethoxazole-trimethoprim    Review of Systems   Constitutional: Negative for activity change, appetite change, chills, fever, unexpected weight change and weight loss. HENT: Negative. Eyes: Negative. Respiratory: Negative. Negative for shortness of breath. Cardiovascular: Negative. Negative for chest pain and palpitations. Gastrointestinal: Negative. Negative for abdominal pain and bowel incontinence. Endocrine: Negative. Genitourinary: Negative. Negative for bladder incontinence, dysuria and pelvic pain. Musculoskeletal: Positive for back pain and gait problem. Skin: Negative. Allergic/Immunologic: Negative. Neurological: Negative for tingling, weakness, numbness, headaches and paresthesias. Hematological: Negative. Psychiatric/Behavioral: Negative. Objective:    /80 (Site: Left Upper Arm, Position: Sitting, Cuff Size: Medium Adult)   Pulse 64   Temp 96.9 °F (36.1 °C) (Infrared)   Resp 18   Ht 5' 10\" (1.778 m)   Wt 225 lb (102.1 kg)   SpO2 98%   BMI 32.28 kg/m²     Physical Exam  Constitutional:       General: He is not in acute distress. Eyes:      Conjunctiva/sclera: Conjunctivae normal.      Pupils: Pupils are equal, round, and reactive to light. Cardiovascular:      Rate and Rhythm: Normal rate and regular rhythm. Heart sounds: Normal heart sounds. Pulmonary:      Effort: Pulmonary effort is normal. No respiratory distress. Breath sounds: Normal breath sounds. Abdominal:      General: Bowel sounds are normal.      Tenderness: There is no abdominal tenderness.    Musculoskeletal:         General: No swelling. Cervical back: Normal range of motion and neck supple. No tenderness. Lumbar back: Spasms and tenderness present. No bony tenderness. Right lower leg: No edema. Left lower leg: No edema. Skin:     General: Skin is warm and dry. Findings: No erythema. Neurological:      General: No focal deficit present. Mental Status: He is alert and oriented to person, place, and time. Psychiatric:         Mood and Affect: Mood normal.         Behavior: Behavior normal.         Assessment & Plan:       Diagnosis Orders   1. Strain of lumbar region, initial encounter  methylPREDNISolone (MEDROL, IOANA,) 4 MG tablet    tiZANidine (ZANAFLEX) 4 MG tablet   2. Type 2 diabetes mellitus with hyperlipidemia (HCC)  POCT glycosylated hemoglobin (Hb A1C)    CBC Auto Differential    Comprehensive Metabolic Panel    Lipid Panel    Microalbumin / Creatinine Urine Ratio   3. Screening for prostate cancer  PSA Screening   4. Coronary artery disease involving native coronary artery of native heart without angina pectoris  Lipid Panel     Orders Placed This Encounter   Procedures    CBC Auto Differential     Standing Status:   Future     Standing Expiration Date:   11/23/2022    Comprehensive Metabolic Panel     Standing Status:   Future     Standing Expiration Date:   11/23/2022    Lipid Panel     Standing Status:   Future     Standing Expiration Date:   11/23/2022     Order Specific Question:   Is Patient Fasting?/# of Hours     Answer:   yes    Microalbumin / Creatinine Urine Ratio     Standing Status:   Future     Standing Expiration Date:   11/23/2022    PSA Screening     Standing Status:   Future     Standing Expiration Date:   11/23/2022    POCT glycosylated hemoglobin (Hb A1C)     Orders Placed This Encounter   Medications    methylPREDNISolone (MEDROL, IOANA,) 4 MG tablet     Sig: Take by mouth.      Dispense:  1 kit     Refill:  0    tiZANidine (ZANAFLEX) 4 MG tablet     Sig: Take 1 tablet

## 2021-11-24 ENCOUNTER — TELEPHONE (OUTPATIENT)
Dept: FAMILY MEDICINE CLINIC | Age: 71
End: 2021-11-24

## 2021-11-24 NOTE — TELEPHONE ENCOUNTER
Patient called and was given blood work results. Patient does not want to take a statin medication. Is there an alternate medication for cholesterol? Please advise, thank you.

## 2021-11-26 NOTE — TELEPHONE ENCOUNTER
LMOM informing patient of message and requested that he call the office to let us know if he's willing to try medication for trigs.

## 2021-11-26 NOTE — TELEPHONE ENCOUNTER
Statins are the standard treatment for cholesterol there is not really any other medication that will work on total cholesterol we can do a medication to help specifically with triglycerides which are also very high. Is patient willing to start this?

## 2021-11-28 ENCOUNTER — PATIENT MESSAGE (OUTPATIENT)
Dept: FAMILY MEDICINE CLINIC | Age: 71
End: 2021-11-28

## 2021-11-28 DIAGNOSIS — S39.012A STRAIN OF LUMBAR REGION, INITIAL ENCOUNTER: ICD-10-CM

## 2021-11-29 DIAGNOSIS — I25.10 CORONARY ARTERY DISEASE INVOLVING NATIVE CORONARY ARTERY OF NATIVE HEART WITHOUT ANGINA PECTORIS: ICD-10-CM

## 2021-11-29 RX ORDER — TIZANIDINE 2 MG/1
2 TABLET ORAL EVERY 8 HOURS PRN
Qty: 90 TABLET | Refills: 0 | Status: SHIPPED | OUTPATIENT
Start: 2021-11-29

## 2021-11-29 NOTE — TELEPHONE ENCOUNTER
Recent Visits    11/23/2021 Strain of lumbar region, initial encounter   [facility] and [address] [place] [address], APRN - CNP

## 2021-12-08 ENCOUNTER — OFFICE VISIT (OUTPATIENT)
Dept: FAMILY MEDICINE CLINIC | Age: 71
End: 2021-12-08
Payer: COMMERCIAL

## 2021-12-08 VITALS
RESPIRATION RATE: 15 BRPM | HEART RATE: 75 BPM | BODY MASS INDEX: 32.21 KG/M2 | OXYGEN SATURATION: 98 % | WEIGHT: 225 LBS | DIASTOLIC BLOOD PRESSURE: 76 MMHG | SYSTOLIC BLOOD PRESSURE: 130 MMHG | HEIGHT: 70 IN | TEMPERATURE: 97.9 F

## 2021-12-08 DIAGNOSIS — E78.5 TYPE 2 DIABETES MELLITUS WITH HYPERLIPIDEMIA (HCC): ICD-10-CM

## 2021-12-08 DIAGNOSIS — S39.012A STRAIN OF LUMBAR REGION, INITIAL ENCOUNTER: Primary | ICD-10-CM

## 2021-12-08 DIAGNOSIS — E11.69 TYPE 2 DIABETES MELLITUS WITH HYPERLIPIDEMIA (HCC): ICD-10-CM

## 2021-12-08 DIAGNOSIS — Z95.5 PRESENCE OF STENT IN ANTERIOR DESCENDING BRANCH OF LEFT CORONARY ARTERY: ICD-10-CM

## 2021-12-08 DIAGNOSIS — E11.21 DIABETIC NEPHROPATHY ASSOCIATED WITH TYPE 2 DIABETES MELLITUS (HCC): ICD-10-CM

## 2021-12-08 PROCEDURE — 99213 OFFICE O/P EST LOW 20 MIN: CPT | Performed by: FAMILY MEDICINE

## 2021-12-08 RX ORDER — PREDNISONE 10 MG/1
TABLET ORAL
Qty: 40 TABLET | Refills: 0 | Status: SHIPPED | OUTPATIENT
Start: 2021-12-08

## 2021-12-08 ASSESSMENT — ENCOUNTER SYMPTOMS
ALLERGIC/IMMUNOLOGIC NEGATIVE: 1
BACK PAIN: 1
EYES NEGATIVE: 1
RESPIRATORY NEGATIVE: 1
SHORTNESS OF BREATH: 0
GASTROINTESTINAL NEGATIVE: 1

## 2021-12-08 NOTE — PROGRESS NOTES
Patient is seen in follow up for   Chief Complaint   Patient presents with    Discuss Labs     Here today to discuss labs completed on 11/23/21. Back Pain  This is a new problem. The current episode started more than 1 month ago. The problem occurs intermittently. The problem has been waxing and waning since onset. The pain is present in the lumbar spine. The pain is at a severity of 4/10. The pain is moderate. The symptoms are aggravated by bending. Pertinent negatives include no chest pain or fever.      Increased proteinuria noted   Past Medical History:   Diagnosis Date    CAD (coronary artery disease)     Diabetes mellitus ([address]     Hypertension     Screening for diabetic retinopathy     March 2014 outside  : Per member     Patient Active Problem List    Diagnosis Date Noted    Primary osteoarthritis of both knees 09/08/2020    Arthritis of knee, degenerative 09/01/2020    Type 2 diabetes mellitus with hyperlipidemia ([address] 04/12/2016    Knee pain 06/19/2015    Encounter for physical therapy 06/19/2015    Presence of stent in anterior descending branch of left coronary artery 12/21/2009    Acute MI ([address] 12/21/2009    CAD (coronary artery disease) 11/27/2009    History of tobacco use 11/27/2009    Hx of vasectomy 01/28/2002     Past Surgical History:   Procedure Laterality Date    CATARACT REMOVAL WITH IMPLANT Bilateral 2018    DIAGNOSTIC CARDIAC CATH LAB PROCEDURE  11/2009     Family History   Problem Relation Age of Onset    No Known Problems Father     Diabetes Maternal Grandmother      Social History     Socioeconomic History    Marital status:      Spouse name: None    Number of children: None    Years of education: None    Highest education level: None   Occupational History    None   Tobacco Use    Smoking status: Never Smoker    Smokeless tobacco: Never Used   Substance and Sexual Activity    Alcohol use: Yes     Comment: WINE ONLY    Drug use: No    Sexual activity: Yes     Partners: Female   Other Topics Concern    None   Social History Narrative    None     Social Determinants of Health     Financial Resource Strain: Low Risk     Difficulty of Paying Living Expenses: Not hard at all   Food Insecurity: No Food Insecurity    Worried About Running Out of Food in the Last Year: Never true    [address] in the Last Year: Never true   Transportation Needs:     Lack of Transportation (Medical): Not on file    Lack of Transportation (Non-Medical):  Not on file   Physical Activity:     Days of Exercise per Week: Not on file    Minutes of Exercise per Session: Not on file   Stress:     Feeling of Stress : Not on file   Social Connections:     Frequency of Communication with Friends and Family: Not on file    Frequency of Social Gatherings with Friends and Family: Not on file    Attends Jain Services: Not on file    Active Member of [address] [place] [address] [employer] or Organizations: Not on file    Attends Club or Organization Meetings: Not on file    Marital Status: Not on file   Intimate Partner Violence:     Fear of Current or Ex-Partner: Not on file    Emotionally Abused: Not on file    Physically Abused: Not on file    Sexually Abused: Not on file   Housing Stability:     Unable to Pay for Housing in the Last Year: Not on file    [facility]: Not on file    Unstable Housing in the Last Year: Not on file     Current Outpatient Medications   Medication Sig Dispense Refill    predniSONE (DELTASONE) 10 MG tablet 4 po for 4 days  3 po for 4 days  2 po for 4 days  1 po for 4 days 40 tablet 0    lisinopril (PRINIVIL;ZESTRIL) 5 MG tablet Take 1 tablet by mouth 2 times daily 180 tablet 3    metoprolol tartrate (LOPRESSOR) 25 MG tablet TAKE ONE-HALF (1/2) TABLET TWICE A DAY 90 tablet 3    tiZANidine (ZANAFLEX) 2 MG tablet Take 1 tablet by mouth every 8 hours as needed (back pain) 90 tablet 0    Handicap Placard MISC by Does not apply route Good for five years 1 each 0    aspirin EC 81 MG EC tablet Take 1 tablet by mouth daily 90 tablet 1    metFORMIN (GLUCOPHAGE) 500 MG tablet TAKE 1 TABLET TWICE A DAY WITH MEALS 180 tablet 3    Handicap Placard MISC by Does not apply route Duration 1 year 1 each 0     No current facility-administered medications for this visit. Current Outpatient Medications on File Prior to Visit   Medication Sig Dispense Refill    lisinopril (PRINIVIL;ZESTRIL) 5 MG tablet Take 1 tablet by mouth 2 times daily 180 tablet 3    metoprolol tartrate (LOPRESSOR) 25 MG tablet TAKE ONE-HALF (1/2) TABLET TWICE A DAY 90 tablet 3    tiZANidine (ZANAFLEX) 2 MG tablet Take 1 tablet by mouth every 8 hours as needed (back pain) 90 tablet 0    Handicap Placard MISC by Does not apply route Good for five years 1 each 0    aspirin EC 81 MG EC tablet Take 1 tablet by mouth daily 90 tablet 1    metFORMIN (GLUCOPHAGE) 500 MG tablet TAKE 1 TABLET TWICE A DAY WITH MEALS 180 tablet 3    Handicap Placard MISC by Does not apply route Duration 1 year 1 each 0     No current facility-administered medications on file prior to visit.      Allergies   Allergen Reactions    Sulfamethoxazole-Trimethoprim Hives and Other (See Comments)     07/18/2005;SULFA, 07/18/2005;SULFA     Health Maintenance   Topic Date Due    Hepatitis C screen  Never done    Colon Cancer Screen FIT/FOBT  09/03/2016    Shingles Vaccine (2 of 3) 05/28/2018    Diabetic foot exam  05/03/2020    Diabetic retinal exam  11/07/2020    Flu vaccine (1) 09/01/2021    A1C test (Diabetic or Prediabetic)  11/23/2022    Diabetic microalbuminuria test  11/23/2022    Lipid screen  11/23/2022    Potassium monitoring  11/23/2022    Creatinine monitoring  11/23/2022    DTaP/Tdap/Td vaccine (2 - Td or Tdap) 12/30/2025    Pneumococcal 65+ years Vaccine  Completed    COVID-19 Vaccine  Completed    Hepatitis A vaccine  Aged Out    Hib vaccine  Aged Out    Meningococcal (ACWY) vaccine  Aged Out       Review of Systems     Review of Systems   Constitutional: Negative for activity change, appetite change, chills, fever and unexpected weight change. HENT: Negative. Eyes: Negative. Respiratory: Negative. Negative for shortness of breath. Cardiovascular: Negative. Negative for chest pain and palpitations. Gastrointestinal: Negative. Endocrine: Negative. Genitourinary: Negative. Musculoskeletal: Positive for back pain. Skin: Negative. Allergic/Immunologic: Negative. Neurological: Negative. Hematological: Negative. Psychiatric/Behavioral: Negative. Physical Exam  Vitals:    12/08/21 0847   BP: 130/76   Pulse: 75   Resp: 15   Temp: 97.9 °F (36.6 °C)   SpO2: 98%   Weight: 225 lb (102.1 kg)   Height: 5' 10\" (1.778 m)       Physical Exam  Constitutional:       Appearance: He is well-developed. HENT:      Right Ear: External ear normal.      Left Ear: External ear normal.   Eyes:      Conjunctiva/sclera: Conjunctivae normal.      Pupils: Pupils are equal, round, and reactive to light. Neck:      Thyroid: No thyromegaly. Cardiovascular:      Rate and Rhythm: Normal rate and regular rhythm. Heart sounds: Normal heart sounds. No murmur heard. No friction rub. No gallop. Pulmonary:      Effort: Pulmonary effort is normal. No respiratory distress. Breath sounds: Normal breath sounds. No wheezing. Abdominal:      General: Bowel sounds are normal. There is no distension. Palpations: Abdomen is soft. There is no mass. Tenderness: There is no abdominal tenderness. There is no guarding or rebound. Hernia: No hernia is present. Genitourinary:     Penis: Normal.    Musculoskeletal:         General: No tenderness. Normal range of motion. Cervical back: Normal range of motion and neck supple. Lymphadenopathy:      Cervical: No cervical adenopathy. Skin:     General: Skin is warm and dry.    Neurological:      Mental Status: He is alert and oriented to person, place, and time. Cranial Nerves: No cranial nerve deficit. Coordination: Coordination normal.         Assessment   Diagnosis Orders   1. Strain of lumbar region, initial encounter     2. Diabetic nephropathy associated with type 2 diabetes mellitus (HCC)  Ebony Silva MD, Nephrology, [facility]   3. Type 2 diabetes mellitus with hyperlipidemia ([facility]     4. Presence of stent in anterior descending branch of left coronary artery       Problem List     Presence of stent in anterior descending branch of left coronary artery    Type 2 diabetes mellitus with hyperlipidemia (HCC)    Relevant Medications    metFORMIN (GLUCOPHAGE) 500 MG tablet    aspirin EC 81 MG EC tablet    metoprolol tartrate (LOPRESSOR) 25 MG tablet    lisinopril (PRINIVIL;ZESTRIL) 5 MG tablet          Plan  Orders Placed This Encounter   Procedures   Ebony Silva MD, Nephrology, [place]     Referral Priority:   Routine     Referral Type:   Eval and Treat     Referral Reason:   Specialty Services Required     Referred to Provider:   Amadou Burciaga MD     Requested Specialty:   Nephrology     Number of Visits Requested:   1     Orders Placed This Encounter   Medications    predniSONE (DELTASONE) 10 MG tablet     Si po for 4 days  3 po for 4 days  2 po for 4 days  1 po for 4 days     Dispense:  40 tablet     Refill:  0     No follow-ups on file.   Yulisa Nava MD

## 2022-04-12 DIAGNOSIS — E11.9 DIABETES TYPE 2, CONTROLLED (HCC): ICD-10-CM

## 2022-04-12 NOTE — TELEPHONE ENCOUNTER
Rx requested:  Requested Prescriptions     Pending Prescriptions Disp Refills    metFORMIN (GLUCOPHAGE) 500 MG tablet [Pharmacy Med Name: METFORMIN HCL TABS 500MG] 180 tablet 3     Sig: TAKE 1 TABLET TWICE A DAY WITH MEALS         Last Office Visit:   Visit date not found      Next Visit Date:  Future Appointments   Date Time Provider Vera De La Garza   6/8/2022  9:00 AM Leda Reyna MD [id number] [facility]

## 2022-05-26 ENCOUNTER — IMMUNIZATION (OUTPATIENT)
Dept: FAMILY MEDICINE CLINIC | Age: 72
End: 2022-05-26
Payer: COMMERCIAL

## 2022-05-26 ENCOUNTER — TELEPHONE (OUTPATIENT)
Dept: FAMILY MEDICINE CLINIC | Age: 72
End: 2022-05-26

## 2022-05-26 DIAGNOSIS — E78.5 TYPE 2 DIABETES MELLITUS WITH HYPERLIPIDEMIA (HCC): Primary | ICD-10-CM

## 2022-05-26 DIAGNOSIS — E11.69 TYPE 2 DIABETES MELLITUS WITH HYPERLIPIDEMIA (HCC): Primary | ICD-10-CM

## 2022-05-26 DIAGNOSIS — B35.1 ONYCHOMYCOSIS: ICD-10-CM

## 2022-05-26 PROCEDURE — 0064A COVID-19, MODERNA BOOSTER VACCINE 0.25ML DOSE: CPT | Performed by: FAMILY MEDICINE

## 2022-05-26 PROCEDURE — 91306 COVID-19, MODERNA BOOSTER VACCINE 0.25ML DOSE: CPT | Performed by: FAMILY MEDICINE

## 2022-05-26 NOTE — TELEPHONE ENCOUNTER
patient was here for Covid vaccine booster and is requesting some help wilh his toenails he would like referral to podiatrist ( I suggested Dr. Sanjeev Tapia) for diabetic foot and toenail trimming. I told patient that office would call him with information.     Thank you

## 2022-05-28 ENCOUNTER — PATIENT MESSAGE (OUTPATIENT)
Dept: FAMILY MEDICINE CLINIC | Age: 72
End: 2022-05-28

## 2022-05-28 DIAGNOSIS — E11.8 DIABETIC FOOT (HCC): Primary | ICD-10-CM

## 2022-08-23 ENCOUNTER — COMMUNITY OUTREACH (OUTPATIENT)
Dept: FAMILY MEDICINE CLINIC | Age: 72
End: 2022-08-23

## 2022-10-13 ENCOUNTER — PATIENT MESSAGE (OUTPATIENT)
Dept: FAMILY MEDICINE CLINIC | Age: 72
End: 2022-10-13

## 2022-10-13 RX ORDER — METHYLPREDNISOLONE 4 MG/1
TABLET ORAL
Qty: 1 KIT | Refills: 0 | Status: SHIPPED | OUTPATIENT
Start: 2022-10-13 | End: 2022-10-17 | Stop reason: SDUPTHER

## 2022-10-13 RX ORDER — METHYLPREDNISOLONE 4 MG/1
TABLET ORAL
Qty: 1 KIT | Refills: 0 | Status: CANCELLED | OUTPATIENT
Start: 2022-10-13 | End: 2022-10-19

## 2022-11-01 RX ORDER — METHYLPREDNISOLONE 4 MG/1
TABLET ORAL
Qty: 1 KIT | Refills: 0 | Status: SHIPPED | OUTPATIENT
Start: 2022-11-01 | End: 2022-11-07

## 2022-11-24 DIAGNOSIS — I25.10 CORONARY ARTERY DISEASE INVOLVING NATIVE CORONARY ARTERY OF NATIVE HEART WITHOUT ANGINA PECTORIS: ICD-10-CM

## 2022-11-25 NOTE — TELEPHONE ENCOUNTER
Future Appointments    This patient does not currently have any appointments scheduled.  LMTCB & schedule next visit      Past Visits    Date Provider Specialty Visit Type Primary Dx   12/08/2021 Prudencio Pierre MD Family Medicine Office Visit Strain of lumbar region, initial encounter

## 2023-01-20 RX ORDER — LISINOPRIL 5 MG/1
5 TABLET ORAL 2 TIMES DAILY
Qty: 180 TABLET | Refills: 3 | Status: SHIPPED | OUTPATIENT
Start: 2023-01-20

## 2023-02-06 ENCOUNTER — INITIAL CONSULT (OUTPATIENT)
Dept: PAIN MANAGEMENT | Age: 73
End: 2023-02-06
Payer: COMMERCIAL

## 2023-02-06 VITALS
HEIGHT: 70 IN | SYSTOLIC BLOOD PRESSURE: 110 MMHG | TEMPERATURE: 97.7 F | WEIGHT: 240 LBS | BODY MASS INDEX: 34.36 KG/M2 | DIASTOLIC BLOOD PRESSURE: 68 MMHG

## 2023-02-06 DIAGNOSIS — M25.562 BILATERAL CHRONIC KNEE PAIN: ICD-10-CM

## 2023-02-06 DIAGNOSIS — G89.29 CHRONIC BILATERAL LOW BACK PAIN WITHOUT SCIATICA: Primary | ICD-10-CM

## 2023-02-06 DIAGNOSIS — M25.561 BILATERAL CHRONIC KNEE PAIN: ICD-10-CM

## 2023-02-06 DIAGNOSIS — G89.29 BILATERAL CHRONIC KNEE PAIN: ICD-10-CM

## 2023-02-06 DIAGNOSIS — M79.605 LEFT LEG PAIN: ICD-10-CM

## 2023-02-06 DIAGNOSIS — M54.50 CHRONIC BILATERAL LOW BACK PAIN WITHOUT SCIATICA: Primary | ICD-10-CM

## 2023-02-06 PROCEDURE — 1123F ACP DISCUSS/DSCN MKR DOCD: CPT | Performed by: PHYSICAL MEDICINE & REHABILITATION

## 2023-02-06 PROCEDURE — 99204 OFFICE O/P NEW MOD 45 MIN: CPT | Performed by: PHYSICAL MEDICINE & REHABILITATION

## 2023-02-06 RX ORDER — LIDOCAINE 40 MG/G
CREAM TOPICAL
Qty: 45 G | Refills: 1 | Status: SHIPPED | OUTPATIENT
Start: 2023-02-06

## 2023-02-06 RX ORDER — GABAPENTIN 300 MG/1
300 CAPSULE ORAL NIGHTLY
Qty: 30 CAPSULE | Refills: 0 | Status: SHIPPED | OUTPATIENT
Start: 2023-02-06 | End: 2023-03-08

## 2023-02-06 ASSESSMENT — ENCOUNTER SYMPTOMS
SHORTNESS OF BREATH: 0
CONSTIPATION: 0
DIARRHEA: 0
NAUSEA: 0
BACK PAIN: 1

## 2023-02-06 NOTE — PROGRESS NOTES
Marivel Robbins  (80/55/2094)    2/6/2023    Subjective:     Marivel Robbins is 71 y.o. male who complains today of:    Chief Complaint   Patient presents with    Back Pain       Marivel Robbins is a 71 y.o. male who presents for evaluation by request of Raissa Mitchell for lumbar spinal stenosis L5-S1 spondylolisthesis. The patient is a limited historian, tangential.    He has struggled with pain for for over 2 years. He denies any immediately-preceding traumatic or inciting events. He has been previously evaluated by Dr Jalen Shearer whose records are reviewed below. He describes pain located in both sides of his low back and into both legs. Pain is a constant ache and is currently a 6/10 and gets up to a 8/10 at its worst and goes down to a 5/10 at its best. Pain is worse with walking. Pain is better with sitting. Pain is located 50% on the right and 50% on the left. Pain is located 50% in the back and 50% in the legs. He has bilateral knee pain that gets to a 7/10. Located in both knees. Worse with walking and standing. Better with rest. Constant ache for over 1 year. He denies any numbness, tingling, weakness, bowel or bladder dysfunction, saddle anesthesia, falls, history of cancer, unexplained weight loss, persistent night pain and sweats, fever, IV drug abuse, immunocompromise, chronic prednisone or antibiotic use, or any other red flag symptoms. Mood is good, denies any suicidal or homicidal ideation. Sleep is fair, awakes fatigued.     He has tried:  Home exercise program with minimal relief  Chiropractic treatment with Dr Jalen Shearer    Diagnostic testing previously performed includes XRs    Medications tried include:  Acetaminophen with minimal relief for over 3 months  Ibuprofen with minimal relief for over 3 months  Medrol dose pack helped    Allergies, Medications, Past Medical History, Family History, Social History, Work History, and Review of Systems reviewed below     +CAD and MI with 3 stents in 2009 no anticoagulation   +diabetes mellitus   +Kidney disease Stage 1 followed by Nephrology Dr Kenton Siddiqi    No Seizures, Epilepsy or Brain Surgery     Spends his time: He used to work at [address] for 13 years. He used to enjoy playing piano, he enjoys playing chess online. Allergies:  Sulfamethoxazole-trimethoprim    Past Medical History:   Diagnosis Date    CAD (coronary artery disease)     Diabetes mellitus ([facility] [address]     Hypertension     Screening for diabetic retinopathy     March 2014 outside  : Per member     Past Surgical History:   Procedure Laterality Date    CATARACT REMOVAL WITH IMPLANT Bilateral 2018    DIAGNOSTIC CARDIAC CATH LAB PROCEDURE  11/2009     Family History   Problem Relation Age of Onset    No Known Problems Father     Diabetes Maternal Grandmother      Social History     Socioeconomic History    Marital status:      Spouse name: Not on file    Number of children: Not on file    Years of education: Not on file    Highest education level: Not on file   Occupational History    Not on file   Tobacco Use    Smoking status: Never    Smokeless tobacco: Never   Substance and Sexual Activity    Alcohol use: Yes     Comment: WINE ONLY    Drug use: No    Sexual activity: Yes     Partners: Female   Other Topics Concern    Not on file   Social History Narrative    Not on file     Social Determinants of Health     Financial Resource Strain: Not on file   Food Insecurity: Not on file   Transportation Needs: Not on file   Physical Activity: Not on file   Stress: Not on file   Social Connections: Not on file   Intimate Partner Violence: Not on file   Housing Stability: Not on file       Current Outpatient Medications on File Prior to Visit   Medication Sig Dispense Refill    lisinopril (PRINIVIL;ZESTRIL) 5 MG tablet Take 1 tablet by mouth in the morning and 1 tablet in the evening.  180 tablet 3    metFORMIN (GLUCOPHAGE) 500 MG tablet TAKE 1 TABLET TWICE A DAY WITH MEALS 180 tablet 3    Handicap Placard MISC by Does not apply route Good for five years 1 each 0    aspirin EC 81 MG EC tablet Take 1 tablet by mouth daily 90 tablet 1    Handicap Placard MISC by Does not apply route Duration 1 year 1 each 0    predniSONE (DELTASONE) 10 MG tablet 4 po for 4 days  3 po for 4 days  2 po for 4 days  1 po for 4 days (Patient not taking: Reported on 2/6/2023) 40 tablet 0    tiZANidine (ZANAFLEX) 2 MG tablet Take 1 tablet by mouth every 8 hours as needed (back pain) (Patient not taking: Reported on 2/6/2023) 90 tablet 0     No current facility-administered medications on file prior to visit. Review of Systems   Constitutional:  Negative for fever. HENT:  Negative for hearing loss. Respiratory:  Negative for shortness of breath. Gastrointestinal:  Negative for constipation, diarrhea and nausea. Genitourinary:  Negative for difficulty urinating. Musculoskeletal:  Positive for back pain. Negative for neck pain. Skin:  Negative for rash. Neurological:  Negative for headaches. Hematological:  Does not bruise/bleed easily. Psychiatric/Behavioral:  Negative for sleep disturbance. Objective:     Vitals:  /68 (Site: Right Upper Arm)   Temp 97.7 °F (36.5 °C) (Temporal)   Ht 5' 10\" (1.778 m)   Wt 240 lb (108.9 kg)   BMI 34.44 kg/m² Pain Score:   6      Exam performed under Coronavirus precautions  Gen: No acute distress  Neck: Grossly symmetric without any significant thyromegaly or masses appreciated. Eyes: No scleral icterus or lid lag appreciated bilaterally. Irises without gross defects bilaterally. HEENT: Hearing impaired. Normocephalic, external ears and visible portions of nose and mouth atraumatic. Lymph: No gross neck or axillary lymphadenopathy  Cardio: No significant lower extremity edema, pulses intact without significant digit ischemia. Abd: No gross masses or large hernias appreciated. Skin: Visualized skin without any dermatomal rashes or sores. Palpation free of any tightening or subcutaneous nodules. MSK: Gait is antalgic. No significant upper limb digit ischemia appreciated. Psych: Pleasant and cooperative with the history and exam. Mood and Affect normal. Appropriately dressed with good eye contact. Judgement and insight normal. Recent and remote memory intact. Alert and Oriented x3. Neuro: Cranial nerves II-XII grossly intact. No significant pathologic reflexes appreciated. Rises from a seated to standing position with moderate difficulty. Gait is antalgic. No assistive devices used. Heel and toe walk intact. Lumbar flexion to 60 degrees, extension to 5 degrees. Limited lumbar spine range of motion. Rotation and extension reproduces axial low back pain. Other facet provocative maneuvers are positive. No gross step offs noted. Tenderness to palpation over the mid to low lumbar spinous processes and left worse than right lumbar paraspinals from L2 down to the sacrum. No tenderness over bilateral PSIS. No tenderness over bilateral greater trochanters. No tenderness over bilateral deep gluteal regions. Sensation grossly intact in both legs except for left L5 paresthesias  Reflexes and strength functional for ambulation, no abnormal reflexes appreciated on exam today  Strength greater than 3/5 bilateral legs  Straight leg raise negative bilaterally. Outside record review:  Review of the original consultation request reveals no specific diagnostic requests or clinical concerns aside from Lumbar spinal stenosis L5-S1 spondylolisthesis that require particular attention. There are no suggested, requested, or specified tests to be ordered or any prior diagnostics performed that require follow-up or further investigation.     Dr. Anita Arvizu notes not available for review at the time of clinical encounter    No spine imaging available for review at the time of clinical encounter    Lab review  Creatinine 0.86 normal on 4/2/2018  Platelets 608 normal on 4/2/2018      Family history of alcohol abuse 0  Family history of illegal drug abuse 0  Family history of prescription drug abuse 0    Personal history of alcohol abuse/DUI 0  Personal history of illegal drug abuse 0  Personal history of prescription drug abuse 0    Age between 17-45 0    History of preadolescent sexual abuse 0    Personal history of obsessive compulsive disorder 0  Personal history of attention deficit disorder 0  Personal history of bipolar disorder 0  Personal history of schizophrenia 0  Personal history of depression 0    Score = 0, low risk  Assessment:      Diagnosis Orders   1. Chronic bilateral low back pain without sciatica  XR LUMBAR SPINE (2-3 VIEWS)    [facility]    lidocaine (LMX) 4 % cream    gabapentin (NEURONTIN) 300 MG capsule      2. Left leg pain  EMG      3. Bilateral chronic knee pain  XR KNEE LEFT (3 VIEWS)    XR KNEE RIGHT (3 VIEWS)    [place] ARTHROCENTESIS ASPIR&/INJ MAJOR JT/BURSA W/O US          Plan:     Periodic Controlled Substance Monitoring: Assessed functional status. Baldev Damico MD)    Orders Placed This Encounter   Medications    lidocaine (LMX) 4 % cream     Sig: Apply a half dollar sized amount to intact skin topically up to twice daily as needed for pain     Dispense:  45 g     Refill:  1    gabapentin (NEURONTIN) 300 MG capsule     Sig: Take 1 capsule by mouth nightly for 30 days.      Dispense:  30 capsule     Refill:  0         Orders Placed This Encounter   Procedures    XR LUMBAR SPINE (2-3 VIEWS)     Standing Status:   Future     Standing Expiration Date:   2/6/2024     Order Specific Question:   Reason for exam:     Answer:   Please evaluate for the presence of lumbar facet arthropathy    XR KNEE LEFT (3 VIEWS)     Standing Status:   Future     Standing Expiration Date:   5/7/2023     Order Specific Question:   Reason for exam:     Answer:   Evaluate for osteoarthritis    XR KNEE RIGHT (3 VIEWS)     Standing Status: Future     Standing Expiration Date:   5/7/2023     Order Specific Question:   Reason for exam:     Answer:   Evaluate for osteoarthritis    [facility]     Referral Priority:   Routine     Referral Type:   Eval and Treat     Referral Reason:   Specialty Services Required     Requested Specialty:   Physical Therapist     Number of Visits Requested:   1    EMG     Standing Status:   Future     Standing Expiration Date:   2/6/2024     Order Specific Question:   Which body part? Answer:   Bilateral lower extremities    [place] ARTHROCENTESIS ASPIR&/INJ MAJOR JT/BURSA W/O US     Bilateral knee corticosteroid inj under XR with Dr Bernadine Arellano. +Asa 81 mg ok. +DM ok. 15 minute procedure     Standing Status:   Future     Standing Expiration Date:   5/7/2023         -PT for low back pain  -XR LS Spine AP LAT to evaluate for lumbar facet arthropathy  -EMG B LE eval left leg pain  -XR B Knees eval osteoarthritis   -Consideration for MRI LS Spine without contrast may be given if his symptoms do not improve with conservative treatment  -Lidocaine 4% ointment topical BID prn #1 tube one refill start 2/6/2023   -No NSAIDs recommended given coronary artery disease s/p MI  -Start Gabapentin 300 mg by mouth every evening #30 no refills start 2/6/2023. [place] reviewed on 2/6/2023   -Consider Tizanidine   -Consideration for opioids may be given  -Consider Left Lumbar L5/S1 TFESI if leg pain returns, otherwise may consider Lumbar Facet injections. +Asa 81 mg. No antibiotics, +DM, no osteoporosis, no bleeding or platelet dysfunction, IV Dye okay, allergies reviewed,   -Bilateral knee corticosteroid inj under XR with Dr Bernadine Arellano. 15 minute procedure. Consider 6 mg total betamethasone. Controlled Substance Monitoring:    Acute and Chronic Pain Monitoring:   RX Monitoring 2/6/2023   Periodic Controlled Substance Monitoring Assessed functional status.           Discussed the risks, side effects, and symptoms that would warrant urgent or emergent physician evaluation of all medications prescribed today. Discussed the risks of the above recommended procedures including but not limited to bleeding, infection, worsened pain, damage to surrounding structures, side effects, toxicity, allergic reactions to medications used, immune and stress-response dysfunction, fat necrosis, decreased bone mineralization, cartilage loss, increased fracture risk, avascular necrosis, skin pigmentation changes, blood sugar elevation, need for surgery, premature damage or degeneration of the joint, as well as catastrophic injury such as vision loss, paralysis, stroke, bowel or bladder incontinence, ventilator dependence, loss of use of the joint and/or extremity, and death. Discussed the risks, benefits, alternative procedures, and alternatives to the procedure including no procedure at all. Discussed that we cannot undo any permanent neurologic or orthopaedic damage or change the course of any underlying disease. The patient appears to be a good candidate for the above recommended procedures, but no guarantees expressed or implied are given regarding the outcome of any procedure. After thorough discussion, patient expressed complete understanding and willingness to proceed. Discussed the risks of the above recommended procedures including but not limited to bleeding, infection, worsened pain, damage to surrounding structures, side effects, toxicity, allergic reactions to medications used, immune and stress-response dysfunction, fat necrosis, skin pigmentation changes, blood sugar elevation, headache, vision changes, need for surgery, as well as catastrophic injury such as vision loss, paralysis, stroke, spinal cord and/or plexus infarction or injury, intrathecal injection, spinal cord puncture, arachnoiditis, discitis, bowel or bladder incontinence, ventilator dependence, loss of use of the arms and/or legs, and death.  Discussed off-label use of corticosteroids and how the Food and Drug Administration (FDA) has not approved corticosteroids for epidural use. Discussed the risks, benefits, alternative procedures, and alternatives to the procedure including no procedure at all. Discussed that we cannot undo any permanent neurologic damage or change the course of any underlying disease. The patient appears to be a good candidate for the above recommended procedures, but no guarantees expressed or implied are given regarding the outcome of any procedure. After thorough discussion, patient expressed understanding and willingness to proceed. Provided education and counseling regarding the diagnosis, prognosis, and treatment options. All questions were answered. Encouraged him to follow-up with his primary care physician and/or specialists as required for his overall health and management of his comorbidities as well as any new positive symptoms mentioned in review of systems above. Care was provided within the definitions and limitations of our specialty practice. Encouraged lifestyle interventions including healthy habits, lifestyle changes, regular aerobic exercise and appropriate weight maintenance as advised by their primary care physician or cardiovascular health provider. Discussed well care and disease prevention/maintenance. All recommendations for therapy are provided to improve function with activities of daily living, decrease pain, and help develop an exercise program. All recommendations for medications are meant to help decrease pain, improve function with activities of daily living, maintain compliance with home exercise program, and improve quality of life. All recommendations for therapeutic injections are meant to help decrease pain, improve function with activities of daily living, maintain compliance with home exercise program, improve quality of life, and decrease reliance upon oral medications.  All recommendations for diagnostic injections are meant to help assess the hypothesis that the targeted structure is a significant pain generator that limits the patient's function, causes pain, and reduces his quality of life. Encouraged compliance with his home exercise program. Recommended compliance with physical therapy program as outlined above. Discussed the elevated risks of excessive sedation while on pain medications. Advised him against driving or operating heavy machinery or performing any activities where he may harm himself or others while on pain medications. Particular caution was emphasized especially during dose adjustments and medication changes. Discussed the elevated risks of respiratory depression and death while on opioid medications, especially when combined with other sedative substances. Discussed the risks of temporary disability, permanent disability, morbidity, and mortality with poorly-managed or undiagnosed medical conditions and comorbidities. Emphasized the importance of timely medical evaluation and treatment as previously recommended by us or other medical professionals. Risks of not pursing these recommendations were emphasized. The patient was offered a treatment at our facility. The physician and patient have discussed in detail the risk of exposure to and/or potential harm posed by the COVID-19 virus with having office visits and procedures at this time versus the risk of delaying the visits and procedures. It is not possible to know either the risk of delaying the visits or procedure or chance of getting an infection with perfect accuracy, but a joint decision was made between the patient and the physician to proceed at this time with the scheduled visits and procedures. Advised him that any lab testing, imaging, or other diagnostic test results are best discussed in person in the office so that we can provide a clear explanation of their significance and best treatment based upon these results. It is his responsibility to make and keep a follow up appointment to discuss these test results in person to discuss the significance of the findings and appropriate follow-up steps. He expressed complete understanding and agreement with the entire plan as outlined above. Portions of this note may have been typed, auto-populated, dictated or transcribed by voice recognition resulting in errors, omissions, or close substitutions which may be missed despite careful proofreading. Please contact the author for any questions or concerns. Thank you Dr. Solo Barraza for the opportunity to participate in this patient's care. If you have any questions or concerns, please do not hesitate to contact us. Follow up:  Return in about 1 month (around 3/6/2023) for reassessment of pain and symptoms, EMG Internal, P.T. Internal Ref.     Yasmeen Vale MD

## 2023-02-07 DIAGNOSIS — M25.561 BILATERAL CHRONIC KNEE PAIN: ICD-10-CM

## 2023-02-07 DIAGNOSIS — M25.562 BILATERAL CHRONIC KNEE PAIN: ICD-10-CM

## 2023-02-07 DIAGNOSIS — G89.29 CHRONIC BILATERAL LOW BACK PAIN WITHOUT SCIATICA: ICD-10-CM

## 2023-02-07 DIAGNOSIS — M54.50 CHRONIC BILATERAL LOW BACK PAIN WITHOUT SCIATICA: ICD-10-CM

## 2023-02-07 DIAGNOSIS — G89.29 BILATERAL CHRONIC KNEE PAIN: ICD-10-CM

## 2023-02-17 ENCOUNTER — TELEPHONE (OUTPATIENT)
Dept: PAIN MANAGEMENT | Age: 73
End: 2023-02-17

## 2023-02-21 ENCOUNTER — PROCEDURE VISIT (OUTPATIENT)
Dept: PAIN MANAGEMENT | Age: 73
End: 2023-02-21

## 2023-02-21 DIAGNOSIS — M25.562 BILATERAL CHRONIC KNEE PAIN: Primary | ICD-10-CM

## 2023-02-21 DIAGNOSIS — M25.561 BILATERAL CHRONIC KNEE PAIN: Primary | ICD-10-CM

## 2023-02-21 DIAGNOSIS — G89.29 BILATERAL CHRONIC KNEE PAIN: Primary | ICD-10-CM

## 2023-02-21 RX ORDER — BETAMETHASONE SODIUM PHOSPHATE AND BETAMETHASONE ACETATE 3; 3 MG/ML; MG/ML
6 INJECTION, SUSPENSION INTRA-ARTICULAR; INTRALESIONAL; INTRAMUSCULAR; SOFT TISSUE ONCE
Status: COMPLETED | OUTPATIENT
Start: 2023-02-21 | End: 2023-02-21

## 2023-02-21 RX ORDER — LIDOCAINE HYDROCHLORIDE 10 MG/ML
5 INJECTION, SOLUTION INFILTRATION; PERINEURAL ONCE
Status: COMPLETED | OUTPATIENT
Start: 2023-02-21 | End: 2023-02-21

## 2023-02-21 RX ADMIN — BETAMETHASONE SODIUM PHOSPHATE AND BETAMETHASONE ACETATE 6 MG: 3; 3 INJECTION, SUSPENSION INTRA-ARTICULAR; INTRALESIONAL; INTRAMUSCULAR; SOFT TISSUE at 11:13

## 2023-02-21 RX ADMIN — LIDOCAINE HYDROCHLORIDE 5 ML: 10 INJECTION, SOLUTION INFILTRATION; PERINEURAL at 11:13

## 2023-02-21 NOTE — PROGRESS NOTES
Knee Intraarticular Corticosteroid Joint Injection         Patient Name: Brynn Cheema   : 1950     Date: 2023   Provider: Turner Gudino MD        PROCEDURE: Bilateral knee intraarticular corticosteroid joint injection under fluoroscopic guidance    INDICATIONS: Brynn Cheema is a 71 y.o. male who presents with symptoms and physical exam findings consistent with bilateral knee pain. He has had persistent pain that limits his activities of daily living such as lower body dressing. The pain is persistent despite conservative measures including home exercise program. Given his symptoms, physical exam findings, impairment in activities of daily living, and lack of response to conservative measures, consideration for Bilateral knee corticosteroid joint injection under fluoroscopic guidance was given. Discussed the risks including but not limited to bleeding, infection, worsened pain, damage to surrounding structures, side effects, toxicity, allergic reactions to medications used, immune and stress-response dysfunction, fat necrosis, decreased bone mineralization, cartilage loss, increased fracture risk, avascular necrosis, skin pigmentation changes, blood sugar elevation, need for surgery, premature damage or degeneration of the joint, as well as catastrophic injury such as vision loss, paralysis, stroke, bowel or bladder incontinence, ventilator dependence, loss of use of the joint and/or extremity, and death. Discussed the risks, benefits, alternative procedures, and alternatives to the procedure including no procedure at all. Discussed that we cannot undo any permanent neurologic or orthopaedic damage or change the course of any underlying disease. After thorough discussion, patient expressed understanding and willingness to proceed. Written consent was obtained and is in the chart. Verbal consent to proceed was obtained. DESCRIPTION OF PROCEDURE:  The site was marked. A time-out was performed. Fluoroscopy was used to visualize pertinent anatomy and identify a tract free of any critical neurovascular structures. The site was prepped with Betadine three times and maintained in a sterile manner throughout the entire procedure. Attention was turned to the right knee. Ethyl chloride spray was used to anesthetize the skin. Then a 27-gauge 1.5 inch needle was advanced under intermittent fluoroscopic guidance into the tibiofemoral joint space. The joint space was entered. Aspiration for blood was negative throughout the entire procedure. AP and lateral views were obtained. Then a 3 mL injectate containing 0.5 mL of 3 mg of betamethasone and 2.5 mL of 1% preservative-free lidocaine was injected without resistance or difficulty. The needle was flushed and removed. The site was hemostatic. The site was cleaned and appropriately dressed. Attention was turned to the left knee. Ethyl chloride spray was used to anesthetize the skin. Then a 27-gauge 1.5 inch needle was advanced under intermittent fluoroscopic guidance into the tibiofemoral joint space. The joint space was entered. Aspiration for blood was negative throughout the entire procedure. AP and lateral views were obtained. Then a 3 mL injectate containing 0.5 mL of 3 mg of betamethasone and 2.5 mL of 1% preservative-free lidocaine was injected without resistance or difficulty. A total of 6 mg of betamethasone was used for today's procedure. The needle was flushed and removed. The site was hemostatic. The site was cleaned and appropriately dressed. The patient tolerated the procedure well, there were no immediate post procedure complications. Post procedure instructions were given. The patient will follow-up as previously instructed. He will continue anticoagulation uninterrupted. He was advised that his blood sugars may increase after today's procedure.         [address] [facility]  Phone [phone]/TONI [phone]

## 2023-02-22 DIAGNOSIS — I25.10 CORONARY ARTERY DISEASE INVOLVING NATIVE CORONARY ARTERY OF NATIVE HEART WITHOUT ANGINA PECTORIS: ICD-10-CM

## 2023-03-07 ENCOUNTER — HOSPITAL ENCOUNTER (OUTPATIENT)
Dept: PHYSICAL THERAPY | Age: 73
Setting detail: THERAPIES SERIES
Discharge: HOME | End: 2023-03-07
Payer: MEDICARE

## 2023-03-07 PROCEDURE — 97162 PT EVAL MOD COMPLEX 30 MIN: CPT

## 2023-03-07 ASSESSMENT — PAIN DESCRIPTION - LOCATION: LOCATION: BACK

## 2023-03-07 ASSESSMENT — PAIN SCALES - GENERAL: PAINLEVEL_OUTOF10: 1

## 2023-03-07 ASSESSMENT — PAIN DESCRIPTION - DESCRIPTORS: DESCRIPTORS: DISCOMFORT

## 2023-03-07 NOTE — PROGRESS NOTES
[address]   [address]  Phone: [phone]                             Physical Therapy: Initial Evaluation    Patient: Rhonda Presley (82 y.o.     male)   Examination Date: 2023   :  1950 ;    ConfirmedLucia Houston MRN: [id number]  CSN: [id number]   Insurance: Payor: MEDICARE / Plan: MEDICARE PART A / Product Type: *No Product type* /   Insurance ID: [id number] - (Commercial) PT Insurance Information: Medicare/Medical [place] Secondary Insurance (if applicable): [employer]   Referring Physician: Dimas Cheng MD      PCP: Srikanth Monson MD Visits to Date/Visits Approved: 1 /      No Show/Cancelled Appts: 0 / 0     Medical Diagnosis: Low back pain, unspecified [M54.50]  Other chronic pain [G89.29]    Treatment Diagnosis: LBP with strength and ROM defictis affecting overall posture.      PERTINENT MEDICAL HISTORY   Patient Assessed for Rehabilitation Services: Yes  Self reported health status[de-identified] Good    Medical History: Chart Reviewed: Yes   Past Medical History:   Diagnosis Date    CAD (coronary artery disease)     Diabetes mellitus ([facility] [address]     Hypertension     Screening for diabetic retinopathy     2014 outside  : Per member     Surgical History:   Past Surgical History:   Procedure Laterality Date    CATARACT REMOVAL WITH IMPLANT Bilateral 2018    DIAGNOSTIC CARDIAC CATH LAB PROCEDURE  2009       Medications:   Current Outpatient Medications:     lidocaine (XYLOCAINE) 5 % ointment, Apply a half dollar sized amount to intact skin up to twice daily as needed for pain, Disp: 240 g, Rfl: 0    lidocaine (LIDODERM) 5 %, Place 1 patch onto the skin daily 12 hours on, 12 hours off., Disp: 30 patch, Rfl: 0    Lidocaine (ZTLIDO) 1.8 % PTCH, Apply 1 patch topically daily, Disp: 30 patch, Rfl: 0    lidocaine (LMX) 4 % cream, Apply a half dollar sized amount to intact skin topically up to twice daily as needed for pain, Disp: 45 g, Rfl: 1    gabapentin (NEURONTIN) 300 MG capsule, Take 1 capsule by mouth nightly for 30 days. , Disp: 30 capsule, Rfl: 0    lisinopril (PRINIVIL;ZESTRIL) 5 MG tablet, Take 1 tablet by mouth in the morning and 1 tablet in the evening., Disp: 180 tablet, Rfl: 3    metFORMIN (GLUCOPHAGE) 500 MG tablet, TAKE 1 TABLET TWICE A DAY WITH MEALS, Disp: 180 tablet, Rfl: 3    predniSONE (DELTASONE) 10 MG tablet, 4 po for 4 days 3 po for 4 days 2 po for 4 days 1 po for 4 days (Patient not taking: Reported on 2/6/2023), Disp: 40 tablet, Rfl: 0    tiZANidine (ZANAFLEX) 2 MG tablet, Take 1 tablet by mouth every 8 hours as needed (back pain) (Patient not taking: Reported on 2/6/2023), Disp: 90 tablet, Rfl: 0    Handicap Placard MISC, by Does not apply route Good for five years, Disp: 1 each, Rfl: 0    aspirin EC 81 MG EC tablet, Take 1 tablet by mouth daily, Disp: 90 tablet, Rfl: 1    Handicap Placard MISC, by Does not apply route Duration 1 year, Disp: 1 each, Rfl: 0  Allergies: Sulfamethoxazole-trimethoprim      SUBJECTIVE EXAMINATION     History obtained from[de-identified] Patient, Chart Review,           Subjective History: Onset Date: 10/03/22 (Pt reports after slipping increase pain in Oct/Nov and saw a chiropractor.)  Subjective: Pt reports increase back pain after slipping and saw a chiropractor with some relief in addition to prednisone. Pt reports was getting relief and platueaed in progress and referred to pain managment. Additional Pertinent Hx (if applicable): CAD, DM. HTN, B knee arthritis, MI 13 years ago   Imaging: FL Guided Needle Placement    Result Date: 2/21/2023  Radiology exam is complete. No Radiologist dictation. Please follow up with ordering provider.      Prior diagnostic testing[de-identified] MRI, X-ray (EMG scheduled tomorrow)  Previous treatments prior to current episode?: Injections (B knees, no injections in the back)          Pain Screening    Pain Screening  Patient Currently in Pain: Yes  Pain Assessment: 0-10  Pain Level: 1  Best Pain Level: 0  Worst Pain Level: 2  Pain Location: Back  Pain Descriptors: Discomfort (No numbness and tingling as of recent)    Functional Status    Social History:    Social History  Lives With: Spouse  Type of Home: Mobile home  Home Layout: One level  Home Access: Stairs to enter with rails  Entrance Stairs - Number of Steps: wheelchair ramp  Bathroom Shower/Tub: Tub/Shower unit  Home Equipment: Cane    Occupation/Interests:   Occupation: Retired  Leisure & Hobbies: walk dog, bowling    Prior Level of Function:   Walking 30 min          Current Level of Function:   less functional than usual due to pain. Walking limited to 10 min max General   Sleeping Tolerance: disturbed  Bed Mobility: limited  Driving:  WNL  Recreational Activities: limited    ADL Assistance: Independent  Homemaking Assistance: Independent  Homemaking Responsibilities: Yes  Ambulation Assistance: Independent  Transfer Assistance: Independent  Active : Yes  Mode of Transportation: Car         OBJECTIVE EXAMINATION   Review of Systems:  Vision: Within Functional Limits (glassses)  Hearing: Within functional limits ([place], no hearing aids)    VBI Screening / Lumbar Screening:    Bowel/bladder disturbances: No  Saddle anesthesia: No  Unexplained weight loss: No  Severe motor weakness: No  Stumbling or giving way while walking: No  Unrelenting pain at night: No    Observations:   General Observations  General Observations: Yes  Description: Fwd head posture, flexed trunk, L ER LE.  L foot pronation (needs B knee and L ankle surgery.)    Palpation:   Lumbar Spine Palpation: Tightness B lumbar paraspinal    Mobility:    Ambulation  Surface: Carpet  Device: No Device  Assistance: Independent  Gait Deviations: Slow Yesenia, Increased FRANCISCO, Decreased step length  Distance: limited distance tolerated due to pain  Stairs/Curb  Stairs?: Yes  Stairs  # Steps : 1  Stairs Height: 6\"  Rails: Right ascending  Device: No Device  Assistance: Independent      Balance Screen:   Balance  Posture: Good  Sitting - Static: Good  Sitting - Dynamic: Fair  Standing - Static: Fair  Standing - Dynamic: Fair  Single Stance R Le  Single Stance L Le  Comments: Falls in the past    Neuro Screen: Sensation  Overall Sensation Status: WNL (to light touch)  Lower Quarter Deep Tendon Reflex (DTR)  Right Quadriceps (L3-4):  Average/Normal  Left Quadriceps (L3-4):  Average/Normal    Left AROM  Right AROM         AROM LLE (degrees)  L Hip Extension (0-10): 5  L Hip ABduction (0-45): 20  L Hip External Rotation (0-45): 15  L Hip Internal Rotation (0-45): 15    AROM RLE (degrees)  R Hip Flexion (0-125): 60  R Hip Extension (0-10): 10  R Hip ABduction (0-45): 30  R Hip External Rotation (0-45): 25  R Hip Internal Rotation (0-45): 20      Left Strength  Right Strength         Strength LLE  L Hip Flexion: 3/5, 3+/5  L Hip Extension: 3+/5  L Hip ABduction: 3+/5  L Hip Internal Rotation: 3+/5  L Hip External Rotation: 3+/5  L Knee Flexion: 3/5  L Knee Extension: 3+/5  L Ankle Dorsiflexion: 3+/5    Strength RLE  R Hip Flexion: 3/5  R Hip Extension: 3+/5 (sidelying)  R Hip ABduction: 3+/5, 3/5  R Hip Internal Rotation: 3+/5  R Hip External Rotation: 3+/5  R Knee Flexion: 3/5, 3+/5  R Knee Extension: 3/5, 3+/5  R Ankle Dorsiflexion: 4-/5       Lumbar Assessment     AROM Lumbar Spine   Lumbar spine general AROM: Flex 50%, Ext 25%, SB 25%        Trunk Strength     Trunk Strength  Trunk Flexion: 3/5 (seated)  Trunk Extension: 3/5     Muscle Length/Flexibility:   Muscle Length LE  90/90 SLR (Hamstring Tightness): Hamstring flexibility -32°R, -33°L at 90/90 hip/knee position.       Special Tests:   Special Tests Lumbar Spine  ANNE-MARIE Test: R (+), L (+)  Prone Knee Bend Test: R (+), L (+)  Slump Test: R (+), L (+)  Other:  (distraction increase pain)  Special Tests for Hip  Scour (OA, Labrum): R (-), L (-)      Outcomes Score:  Exam: Mod Oswestry 20/50=60% functional      Treatment:    Exercises: Exercises  Exercise 1: bike*  Exercise 2: hip add with ball*  Exercise 3: hip abd with band*  Exercise 4: LAQ/ham curl*  Exercise 5: pelvic tilt*  Exercise 6: SKTC*  Exercise 7: supine LTR*  Exercise 8: midrow/lat pull*  Treatment Reasoning  Limitations addressed: Pain modulation, Posture, Strength, Mobility  Functional ability(s) targeted: Tolerance to age appropriate activities, Ambulating community distances  Modalities:Modalities:  (estim, CP/HP*)        Manual:  Manual Therapy  Soft Tissue Mobilizaton: lumbar paraspinals*  Treatment Reasoning  Limitations addressed: Painful spasm  *Indicates exercise,modality, or manual techniques to be initiated when appropriate       ASSESSMENT     Impression: Assessment: The pt's impairments currently limit functional abilities by 40% including his abilities to walk >10 min, lift, perform recreational activities, and perform household/work related duties without pain or limitations. Skilled PT required to address above deficits to improve overall function and return to prior level of function. Body Structures, Functions, Activity Limitations Requiring Skilled Therapeutic Intervention: Decreased functional mobility , Decreased ROM, Decreased strength, Decreased posture, Increased pain, Decreased ADL status    Statement of Medical Necessity: Physical Therapy is both indicated and medically necessary as outlined in the POC to increase the likelihood of meeting the functionally related goals stated below.      Patient's Activity Tolerance: Patient tolerated evaluation without incident      Patient's rehabilitation potential/prognosis is considered to be: Good    Factors which may impact rehabilitation potential include: None     Patient Education: Goals, PT Role, Plan of Care, Evaluative findings, Insurance      GOALS   Patient Goal(s): Patient Goals : Decrease pain    Short Term Goals Completed by 2 weeks Goal Status   STG 1 The pt will demonstrate improved postural awareness requiring <25% VC's with exercises New   STG 2 Decrease Lumbar pain 50% to assist with improved functional gains. New   STG 3 Demo increased hamstring flexibility -25° at 90/90 hip/knee to allow improved upright posture. New     Long Term Goals Completed by 4-6 weeks Goal Status   LTG 1 Indep HEP for symptom management New   LTG 2 Pt demo improved overall function by reporting greater than 80% per functional survey score New   LTG 3 Pain-free Lumbar AROM to 75% WNL allowing an increase in ADL tolerance. New   LTG 4 Improve B LE hip strength 4/5 to allow patient to improve walking 25 min New   LTG 5 The pt will demo improved B SLS >/=5 seconds to increase ease with ambulation without UE support New        TREATMENT PLAN            Treatment may include any combination of the following: Strengthening, ROM, Home exercise program, Modalities, Neuromuscular re-education, Manual, Stair training, Gait training, Balance training, Functional mobility training     Frequency / Duration:  Patient to be seen 1-2 times per week for 4-6 weeks  Plan Comment:               Eval Complexity:   Decision Making: Medium Complexity  History: Personal Factors and/or Comorbidities Impacting POC: Medium  History: CAD, DM. HTN, B knee arthritis, MI 13 years ago  Examination of body system(s) including body structures and functions, activity limitations, and/or participation restrictions: Medium  Exam: Mod Oswestry 20/50=60% functional  Clinical Presentation: Medium  Clinical Decision Making : Medium Complexity    POST-PAIN     Pain Rating (0-10 pain scale):   no change/10  Location and pain description same as pre-treatment unless indicated. Action: [x] NA  [] Call Physician  [] Perform HEP  [] Meds as prescribed    Evaluation and patient rights have been reviewed and patient agrees with plan of care.   Yes  [x]  No  []   Explain:     Enrique Fall Risk Assessment  Risk Factor Scale  Score   History of Falls [x] Yes  [] No 25  0 25 Secondary Diagnosis [] Yes  [x] No 15  0 0   Ambulatory Aid [] Furniture  [] Crutches/cane/walker  [x] None/bedrest/wheelchair/nurse 30  15  0 0   IV/Heparin Lock [] Yes  [x] No 20  0 0   Gait/Transferring [] Impaired  [x] Weak  [] Normal/bedrest/immobile 20  10  0 10   Mental Status [] Forgets limitations  [x] Oriented to own ability 15  0 0      Total:35     Based on the Assessment score: check the appropriate box.   []  No intervention needed   Low =   Score of 0-24  [x]  Use standard prevention interventions Moderate =  Score of 24-44   [x] Discuss fall prevention strategies   [x] Indicate moderate falls risk on eval  []  Use high risk prevention interventions High = Score of 45 and higher   [] Discuss fall prevention strategies   [] Provide supervision during treatment time      Minutes:  PT Individual Minutes  Time In: 1010  Time Out: 1050  Minutes: 40  Procedure Minutes: 40 min Eval     Electronically signed by Shara Rosenberg PT on 3/7/23 at 2:30 PM EST

## 2023-03-07 NOTE — PROGRESS NOTES
[address] [place] [address]   [address]      NWYSB:[phone]    [x] Certification  [] Recertification [x]  Plan of Care  [] Progress Note [] Discharge      Referring Provider: Flower Correia MD     From:  Lynn Geiger, PT    Patient: Jagdeep Lees (5 y.o. male) : 1950 Date: 2023   Medical Diagnosis: Low back pain, unspecified [M54.50]  Other chronic pain [G89.29]      Treatment Diagnosis: LBP with strength and ROM defictis affecting overall posture. Plan of Care/Certification Expiration Date: 23   Progress Report Period from:  3/7/2023  to 3/7/2023    Visits to Date: 1 No Show: 0 Cancelled Appts: 0    OBJECTIVE:   Short Term Goals - Time Frame for Short Term Goals: 2 weeks    Goals Current/Discharge status  Status   Short Term Goal 1: The pt will demonstrate improved postural awareness requiring <25% VC's with exercises  General Observations  General Observations: Yes  Description: Fwd head posture, flexed trunk, L ER LE.  L foot pronation (needs B knee and L ankle surgery.)  STG Goal 1 Status[de-identified] New   Short Term Goal 2: Decrease Lumbar pain 50% to assist with improved functional gains. Pain Screening  Patient Currently in Pain: Yes  Pain Assessment: 0-10  Pain Level: 1  Best Pain Level: 0  Worst Pain Level: 2  Pain Location: Back  Pain Descriptors: Discomfort (No numbness and tingling as of recent)   STG Goal 2 Status[de-identified] New   Short Term Goal 3: Demo increased hamstring flexibility -25° at 90/90 hip/knee to allow improved upright posture. 90/90 SLR (Hamstring Tightness): Hamstring flexibility -32°R, -33°L at 90/90 hip/knee position. STG Goal 3 Status[de-identified] New   Long Term Goals - Time Frame for Long Term Goals : 4-6 weeks  Goals Current/ Discharge status Met   Long Term Goal 1: Indep HEP for symptom management Needs Written HEP initiated  for symptom management  Needs progression for comprehensive program development.  LTG Goal 1 Status[de-identified] New   Long Term Goal 2: Pt demo improved overall function by reporting greater than 80% per functional survey score Exam: Mod Oswestry 20/50=60% functional   LTG Goal 2 Status[de-identified] New   Long Term Goal 3: Pain-free Lumbar AROM to 75% WNL allowing an increase in ADL tolerance. AROM Lumbar Spine   Lumbar spine general AROM: Flex 50%, Ext 25%, SB 25%   LTG Goal 3 Status[de-identified] New   Long Term Goal 4: Improve B LE hip strength 4/5 to allow patient to improve walking 25 min    Strength LLE  L Hip Flexion: 3/5, 3+/5  L Hip Extension: 3+/5  L Hip ABduction: 3+/5  L Hip Internal Rotation: 3+/5  L Hip External Rotation: 3+/5  L Knee Flexion: 3/5  L Knee Extension: 3+/5  L Ankle Dorsiflexion: 3+/5  Strength RLE  R Hip Flexion: 3/5  R Hip Extension: 3+/5 (sidelying)  R Hip ABduction: 3+/5, 3/5  R Hip Internal Rotation: 3+/5  R Hip External Rotation: 3+/5  R Knee Flexion: 3/5, 3+/5  R Knee Extension: 3/5, 3+/5  R Ankle Dorsiflexion: 4-/5     LTG Goal 4 Status[de-identified] New   Long Term Goal 5: The pt will demo improved B SLS >/=5 seconds to increase ease with ambulation without UE support Single Stance R Le  Single Stance L Le  Comments: Falls in the past     LTG Goal 5 Status[de-identified] New     Body Structures, Functions, Activity Limitations Requiring Skilled Therapeutic Intervention: Decreased functional mobility , Decreased ROM, Decreased strength, Decreased posture, Increased pain, Decreased ADL status  Assessment: The pt's impairments currently limit functional abilities by 40% including his abilities to walk >10 min, lift, perform recreational activities, and perform household/work related duties without pain or limitations. Skilled PT required to address above deficits to improve overall function and return to prior level of function.   Therapy Prognosis: Good    Special Test:         Special Tests Lumbar Spine  ANNE-MARIE Test: R (+), L (+)  Prone Knee Bend Test: R (+), L (+)  Slump Test: R (+), L (+)  Other:  (distraction increase pain)  Special Tests for Hip  Scour (OA, Labrum): R (-), L (-)        PT Education: Goals;PT Role;Plan of Care;Evaluative findings; Insurance    PLAN: [x] Evaluate and Treat  Frequency/Duration:  Plan Frequency: 1-2  Plan weeks: 4-6  Current Treatment Recommendations: Strengthening, ROM, Home exercise program, Modalities, Neuromuscular re-education, Manual, Stair training, Gait training, Balance training, Functional mobility training  Modalities: Heat/Cold, E-stim - unattended, Ultrasound     Precautions:  Falls Risk                          Patient Status:[x] Continue/ Initiate plan of Care     [] Discharge PT. Recommend pt continue with HEP. [] Additional visits requested, Please re-certify for additional visits:        Signature: Electronically signed by Pankaj Hutton PT on 3/7/23 at 2:24 PM EST      If you have any questions or concerns, please don't hesitate to call. Thank you for your referral.    I have reviewed this plan of care and certify a need for medically necessary rehabilitation services.     Physician Signature:__________________________________________________________  Date:  Please sign and return

## 2023-03-09 ENCOUNTER — TELEPHONE (OUTPATIENT)
Dept: PAIN MANAGEMENT | Age: 73
End: 2023-03-09

## 2023-03-10 ENCOUNTER — OFFICE VISIT (OUTPATIENT)
Dept: FAMILY MEDICINE CLINIC | Age: 73
End: 2023-03-10

## 2023-03-10 VITALS
SYSTOLIC BLOOD PRESSURE: 120 MMHG | HEART RATE: 97 BPM | TEMPERATURE: 98.1 F | OXYGEN SATURATION: 98 % | DIASTOLIC BLOOD PRESSURE: 72 MMHG | HEIGHT: 70 IN | RESPIRATION RATE: 16 BRPM | BODY MASS INDEX: 32.5 KG/M2 | WEIGHT: 227 LBS

## 2023-03-10 DIAGNOSIS — E11.69 TYPE 2 DIABETES MELLITUS WITH HYPERLIPIDEMIA (HCC): Primary | ICD-10-CM

## 2023-03-10 DIAGNOSIS — Z12.5 SCREENING PSA (PROSTATE SPECIFIC ANTIGEN): ICD-10-CM

## 2023-03-10 DIAGNOSIS — Z13.220 LIPID SCREENING: ICD-10-CM

## 2023-03-10 DIAGNOSIS — Z12.11 COLON CANCER SCREENING: ICD-10-CM

## 2023-03-10 DIAGNOSIS — Z00.00 PHYSICAL EXAM: ICD-10-CM

## 2023-03-10 DIAGNOSIS — E78.5 TYPE 2 DIABETES MELLITUS WITH HYPERLIPIDEMIA (HCC): Primary | ICD-10-CM

## 2023-03-10 LAB — HBA1C MFR BLD: 5.4 %

## 2023-03-10 SDOH — ECONOMIC STABILITY: FOOD INSECURITY: WITHIN THE PAST 12 MONTHS, THE FOOD YOU BOUGHT JUST DIDN'T LAST AND YOU DIDN'T HAVE MONEY TO GET MORE.: NEVER TRUE

## 2023-03-10 SDOH — ECONOMIC STABILITY: HOUSING INSECURITY
IN THE LAST 12 MONTHS, WAS THERE A TIME WHEN YOU DID NOT HAVE A STEADY PLACE TO SLEEP OR SLEPT IN A SHELTER (INCLUDING NOW)?: NO

## 2023-03-10 SDOH — ECONOMIC STABILITY: INCOME INSECURITY: HOW HARD IS IT FOR YOU TO PAY FOR THE VERY BASICS LIKE FOOD, HOUSING, MEDICAL CARE, AND HEATING?: NOT HARD AT ALL

## 2023-03-10 SDOH — ECONOMIC STABILITY: FOOD INSECURITY: WITHIN THE PAST 12 MONTHS, YOU WORRIED THAT YOUR FOOD WOULD RUN OUT BEFORE YOU GOT MONEY TO BUY MORE.: NEVER TRUE

## 2023-03-10 ASSESSMENT — PATIENT HEALTH QUESTIONNAIRE - PHQ9
2. FEELING DOWN, DEPRESSED OR HOPELESS: 0
SUM OF ALL RESPONSES TO PHQ QUESTIONS 1-9: 0
SUM OF ALL RESPONSES TO PHQ9 QUESTIONS 1 & 2: 0
SUM OF ALL RESPONSES TO PHQ QUESTIONS 1-9: 0
SUM OF ALL RESPONSES TO PHQ QUESTIONS 1-9: 0
1. LITTLE INTEREST OR PLEASURE IN DOING THINGS: 0
SUM OF ALL RESPONSES TO PHQ QUESTIONS 1-9: 0

## 2023-03-10 ASSESSMENT — ENCOUNTER SYMPTOMS
RESPIRATORY NEGATIVE: 1
ALLERGIC/IMMUNOLOGIC NEGATIVE: 1
GASTROINTESTINAL NEGATIVE: 1
SHORTNESS OF BREATH: 0
EYES NEGATIVE: 1

## 2023-03-10 NOTE — PROGRESS NOTES
Patient is seen in follow up for   Chief Complaint   Patient presents with    Diabetes     Last a1c was 5.7     Diabetes  He presents for his follow-up diabetic visit. He has type 2 diabetes mellitus. The initial diagnosis of diabetes was made 12 years ago. His disease course has been stable. There are no hypoglycemic associated symptoms. There are no diabetic associated symptoms. Pertinent negatives for diabetes include no chest pain. Symptoms are stable. There are no diabetic complications. Risk factors for coronary artery disease include diabetes mellitus, hypertension and male sex. Current diabetic treatment includes oral agent (monotherapy). He is compliant with treatment most of the time.      Past Medical History:   Diagnosis Date    CAD (coronary artery disease)     Diabetes mellitus ([address]     Hypertension     Screening for diabetic retinopathy     March 2014 outside  : Per member     Patient Active Problem List    Diagnosis Date Noted    Primary osteoarthritis of both knees 09/08/2020    Arthritis of knee, degenerative 09/01/2020    Type 2 diabetes mellitus with hyperlipidemia ([address] 04/12/2016    Knee pain 06/19/2015    Encounter for physical therapy 06/19/2015    Presence of stent in anterior descending branch of left coronary artery 12/21/2009    Acute MI ([address] 12/21/2009    CAD (coronary artery disease) 11/27/2009    History of tobacco use 11/27/2009    Hx of vasectomy 01/28/2002     Past Surgical History:   Procedure Laterality Date    CATARACT REMOVAL WITH IMPLANT Bilateral 2018    DIAGNOSTIC CARDIAC CATH LAB PROCEDURE  11/2009     Family History   Problem Relation Age of Onset    No Known Problems Father     Diabetes Maternal Grandmother      Social History     Socioeconomic History    Marital status:      Spouse name: None    Number of children: None    Years of education: None    Highest education level: None   Tobacco Use    Smoking status: Never    Smokeless tobacco: Never   Substance and Sexual Activity    Alcohol use: Yes     Comment: WINE ONLY    Drug use: No    Sexual activity: Yes     Partners: Female     Social Determinants of Health     Financial Resource Strain: Low Risk     Difficulty of Paying Living Expenses: Not hard at all   Food Insecurity: No Food Insecurity    Worried About [facility] in the Last Year: Never true    Ran Out of Food in the Last Year: Never true   Transportation Needs: Unknown    Lack of Transportation (Non-Medical): No   Housing Stability: Unknown    Unstable Housing in the Last Year: No     Current Outpatient Medications   Medication Sig Dispense Refill    lidocaine (XYLOCAINE) 5 % ointment Apply a half dollar sized amount to intact skin up to twice daily as needed for pain 240 g 0    lidocaine (LIDODERM) 5 % Place 1 patch onto the skin daily 12 hours on, 12 hours off. 30 patch 0    Lidocaine (ZTLIDO) 1.8 % PTCH Apply 1 patch topically daily 30 patch 0    lidocaine (LMX) 4 % cream Apply a half dollar sized amount to intact skin topically up to twice daily as needed for pain 45 g 1    gabapentin (NEURONTIN) 300 MG capsule Take 1 capsule by mouth nightly for 30 days. 30 capsule 0    lisinopril (PRINIVIL;ZESTRIL) 5 MG tablet Take 1 tablet by mouth in the morning and 1 tablet in the evening. 180 tablet 3    metFORMIN (GLUCOPHAGE) 500 MG tablet TAKE 1 TABLET TWICE A DAY WITH MEALS 180 tablet 3    Handicap Placard MISC by Does not apply route Good for five years 1 each 0    aspirin EC 81 MG EC tablet Take 1 tablet by mouth daily 90 tablet 1    Handicap Placard MISC by Does not apply route Duration 1 year 1 each 0     No current facility-administered medications for this visit.      Current Outpatient Medications on File Prior to Visit   Medication Sig Dispense Refill    lidocaine (XYLOCAINE) 5 % ointment Apply a half dollar sized amount to intact skin up to twice daily as needed for pain 240 g 0    lidocaine (LIDODERM) 5 % Place 1 patch onto the skin daily 12 hours on, 12 hours off. 30 patch 0    Lidocaine (ZTLIDO) 1.8 % PTCH Apply 1 patch topically daily 30 patch 0    lidocaine (LMX) 4 % cream Apply a half dollar sized amount to intact skin topically up to twice daily as needed for pain 45 g 1    gabapentin (NEURONTIN) 300 MG capsule Take 1 capsule by mouth nightly for 30 days. 30 capsule 0    lisinopril (PRINIVIL;ZESTRIL) 5 MG tablet Take 1 tablet by mouth in the morning and 1 tablet in the evening. 180 tablet 3    metFORMIN (GLUCOPHAGE) 500 MG tablet TAKE 1 TABLET TWICE A DAY WITH MEALS 180 tablet 3    Handicap Placard MISC by Does not apply route Good for five years 1 each 0    aspirin EC 81 MG EC tablet Take 1 tablet by mouth daily 90 tablet 1    Handicap Placard MISC by Does not apply route Duration 1 year 1 each 0     No current facility-administered medications on file prior to visit. Allergies   Allergen Reactions    Sulfamethoxazole-Trimethoprim Hives and Other (See Comments)     07/18/2005;SULFA, 07/18/2005;SULFA     Health Maintenance   Topic Date Due    Hepatitis C screen  Never done    Colorectal Cancer Screen  09/03/2016    Shingles vaccine (2 of 3) 05/28/2018    Diabetic foot exam  05/03/2020    COVID-19 Vaccine (5 - Booster for Moderna series) 07/21/2022    Flu vaccine (1) 08/01/2022    Diabetic Alb to Cr ratio (uACR) test  11/23/2022    Lipids  11/23/2022    Depression Screen  11/23/2022    GFR test (Diabetes, CKD 3-4, OR last GFR 15-59)  02/22/2024    A1C test (Diabetic or Prediabetic)  03/10/2024    DTaP/Tdap/Td vaccine (2 - Td or Tdap) 12/30/2025    Pneumococcal 65+ years Vaccine  Completed    Hepatitis A vaccine  Aged Out    Hib vaccine  Aged Out    Meningococcal (ACWY) vaccine  Aged Out       Review of Systems     Review of Systems   Constitutional:  Negative for activity change, appetite change, chills, fever and unexpected weight change. HENT: Negative. Eyes: Negative. Respiratory: Negative. Negative for shortness of breath.   Cardiovascular: Negative.  Negative for chest pain and palpitations.   Gastrointestinal: Negative.    Endocrine: Negative.    Genitourinary: Negative.    Musculoskeletal: Negative.    Skin: Negative.    Allergic/Immunologic: Negative.    Neurological: Negative.    Hematological: Negative.    Psychiatric/Behavioral: Negative.       Physical Exam  Vitals:    03/10/23 1051   BP: 120/72   Pulse: 97   Resp: 16   Temp: 98.1 °F (36.7 °C)   SpO2: 98%   Weight: 227 lb (103 kg)   Height: 5' 10\" (1.778 m)       Physical Exam  Constitutional:       Appearance: He is well-developed.   HENT:      Right Ear: External ear normal.      Left Ear: External ear normal.   Eyes:      Conjunctiva/sclera: Conjunctivae normal.      Pupils: Pupils are equal, round, and reactive to light.   Neck:      Thyroid: No thyromegaly.   Cardiovascular:      Rate and Rhythm: Normal rate and regular rhythm.      Heart sounds: Normal heart sounds. No murmur heard.    No friction rub. No gallop.   Pulmonary:      Effort: Pulmonary effort is normal. No respiratory distress.      Breath sounds: Normal breath sounds. No wheezing.   Abdominal:      General: Bowel sounds are normal. There is no distension.      Palpations: Abdomen is soft. There is no mass.      Tenderness: There is no abdominal tenderness. There is no guarding or rebound.      Hernia: No hernia is present.   Genitourinary:     Penis: Normal.    Musculoskeletal:         General: No tenderness. Normal range of motion.      Cervical back: Normal range of motion and neck supple.   Lymphadenopathy:      Cervical: No cervical adenopathy.   Skin:     General: Skin is warm and dry.   Neurological:      Mental Status: He is alert and oriented to person, place, and time.      Cranial Nerves: No cranial nerve deficit.      Coordination: Coordination normal.       Assessment   Diagnosis Orders   1. Type 2 diabetes mellitus with hyperlipidemia (HCC)  POCT glycosylated hemoglobin (Hb A1C)     Comprehensive Metabolic Panel    CBC with Auto Differential      2. Screening PSA (prostate specific antigen)  PSA Screening      3. Lipid screening  Lipid Panel      4. Physical exam  Comprehensive Metabolic Panel    CBC with Auto Differential        Problem List       Type 2 diabetes mellitus with hyperlipidemia (HCC) - Primary    Relevant Medications    aspirin EC 81 MG EC tablet    metFORMIN (GLUCOPHAGE) 500 MG tablet    lisinopril (PRINIVIL;ZESTRIL) 5 MG tablet    Other Relevant Orders    POCT glycosylated hemoglobin (Hb A1C) (Completed)    Comprehensive Metabolic Panel    CBC with Auto Differential       Plan  Orders Placed This Encounter   Procedures    Lipid Panel     Standing Status:   Future     Standing Expiration Date:   3/8/2024    Comprehensive Metabolic Panel     Standing Status:   Future     Standing Expiration Date:   3/8/2024    CBC with Auto Differential     Standing Status:   Future     Standing Expiration Date:   3/8/2024    PSA Screening     Standing Status:   Future     Standing Expiration Date:   3/8/2024    POCT glycosylated hemoglobin (Hb A1C)     No orders of the defined types were placed in this encounter. No follow-ups on file.   Joce Casper MD

## 2023-03-11 ENCOUNTER — PATIENT MESSAGE (OUTPATIENT)
Dept: FAMILY MEDICINE CLINIC | Age: 73
End: 2023-03-11

## 2023-03-13 DIAGNOSIS — E78.5 TYPE 2 DIABETES MELLITUS WITH HYPERLIPIDEMIA (HCC): ICD-10-CM

## 2023-03-13 DIAGNOSIS — Z12.5 SCREENING PSA (PROSTATE SPECIFIC ANTIGEN): ICD-10-CM

## 2023-03-13 DIAGNOSIS — E11.69 TYPE 2 DIABETES MELLITUS WITH HYPERLIPIDEMIA (HCC): ICD-10-CM

## 2023-03-13 DIAGNOSIS — Z13.220 LIPID SCREENING: ICD-10-CM

## 2023-03-13 DIAGNOSIS — Z00.00 PHYSICAL EXAM: ICD-10-CM

## 2023-03-13 LAB
ALBUMIN SERPL-MCNC: 4.2 G/DL (ref 3.5–4.6)
ALP BLD-CCNC: 83 U/L (ref 35–104)
ALT SERPL-CCNC: 14 U/L (ref 0–41)
ANION GAP SERPL CALCULATED.3IONS-SCNC: 8 MEQ/L (ref 9–15)
AST SERPL-CCNC: 18 U/L (ref 0–40)
BASOPHILS ABSOLUTE: 0 K/UL (ref 0–0.2)
BASOPHILS RELATIVE PERCENT: 0.6 %
BILIRUB SERPL-MCNC: 0.7 MG/DL (ref 0.2–0.7)
BUN BLDV-MCNC: 20 MG/DL (ref 8–23)
CALCIUM SERPL-MCNC: 9.8 MG/DL (ref 8.5–9.9)
CHLORIDE BLD-SCNC: 103 MEQ/L (ref 95–107)
CHOLESTEROL, TOTAL: 203 MG/DL (ref 0–199)
CO2: 27 MEQ/L (ref 20–31)
CREAT SERPL-MCNC: 0.81 MG/DL (ref 0.7–1.2)
EOSINOPHILS ABSOLUTE: 0.2 K/UL (ref 0–0.7)
EOSINOPHILS RELATIVE PERCENT: 3.3 %
GFR SERPL CREATININE-BSD FRML MDRD: >60 ML/MIN/{1.73_M2}
GLOBULIN: 2.7 G/DL (ref 2.3–3.5)
GLUCOSE BLD-MCNC: 96 MG/DL (ref 70–99)
HCT VFR BLD CALC: 41.1 % (ref 42–52)
HDLC SERPL-MCNC: 36 MG/DL (ref 40–59)
HEMOGLOBIN: 13.7 G/DL (ref 14–18)
LDL CHOLESTEROL CALCULATED: 132 MG/DL (ref 0–129)
LYMPHOCYTES ABSOLUTE: 1.3 K/UL (ref 1–4.8)
LYMPHOCYTES RELATIVE PERCENT: 27 %
MCH RBC QN AUTO: 29.5 PG (ref 27–31.3)
MCHC RBC AUTO-ENTMCNC: 33.3 % (ref 33–37)
MCV RBC AUTO: 88.8 FL (ref 79–92.2)
MONOCYTES ABSOLUTE: 0.5 K/UL (ref 0.2–0.8)
MONOCYTES RELATIVE PERCENT: 11.1 %
NEUTROPHILS ABSOLUTE: 2.9 K/UL (ref 1.4–6.5)
NEUTROPHILS RELATIVE PERCENT: 58 %
PDW BLD-RTO: 12.6 % (ref 11.5–14.5)
PLATELET # BLD: 277 K/UL (ref 130–400)
POTASSIUM SERPL-SCNC: 5.1 MEQ/L (ref 3.4–4.9)
PROSTATE SPECIFIC ANTIGEN: 0.95 NG/ML (ref 0–4)
RBC # BLD: 4.63 M/UL (ref 4.7–6.1)
SODIUM BLD-SCNC: 138 MEQ/L (ref 135–144)
TOTAL PROTEIN: 6.9 G/DL (ref 6.3–8)
TRIGL SERPL-MCNC: 173 MG/DL (ref 0–150)
WBC # BLD: 5 K/UL (ref 4.8–10.8)

## 2023-03-13 NOTE — TELEPHONE ENCOUNTER
From: Lila Astudillo  To: Dr. Gómez Overcast: 3/11/2023 9:54 AM EST  Subject: Blood Lab     Went to the blood lab with my paperwork, and they said this was for FASTING testing. I was not informed about that by Dr. Annmarie Nicolas. Can you give me the requirements for FASTING blood work? I believe it's no foods for 8 hours?  But can I take any meds and drink water? thanks Becca Gomez [phone]

## 2023-03-14 ENCOUNTER — HOSPITAL ENCOUNTER (OUTPATIENT)
Dept: PHYSICAL THERAPY | Age: 73
Setting detail: THERAPIES SERIES
Discharge: HOME | End: 2023-03-14
Payer: COMMERCIAL

## 2023-03-14 ENCOUNTER — PATIENT MESSAGE (OUTPATIENT)
Dept: FAMILY MEDICINE CLINIC | Age: 73
End: 2023-03-14

## 2023-03-14 PROCEDURE — 97110 THERAPEUTIC EXERCISES: CPT

## 2023-03-14 ASSESSMENT — PAIN SCALES - GENERAL: PAINLEVEL_OUTOF10: 0

## 2023-03-14 NOTE — PROGRESS NOTES
[address] [facility] [address]   [place], [address]  [facility][phone]      Physical TherapyTreatment Note        Date: 3/14/2023  Patient: Anjel Payor  : 1950   Confirmed: Yes  MRN: [id number]  Referring Provider: Margarita Britt MD      Medical Diagnosis: Low back pain, unspecified [M54.50]  Other chronic pain [G89.29]      Treatment Diagnosis: LBP with strength and ROM defictis affecting overall posture. Visit Information:  Insurance: Payor: [employer] / Plan: [employer] MEDFLEX / Product Type: *No Product type* /   PT Visit Information  Onset Date: 10/03/22 (Pt reports after slipping increase pain in Oct/Nov and saw a chiropractor. )  PT Insurance Information: Medicare/Medical [employer]  Total # of Visits to Date: 2  Plan of Care/Certification Expiration Date: 23  No Show: 0  Progress Note Due Date: 23  Canceled Appointment: 0  Progress Note Counter:  (PN due 23)    Subjective Information:  Subjective: Pt reports that had a cortisone injection in nate knees. HEP Compliance:  [x] Good [] Fair [] Poor [] Reports not doing due to:    Pain Screening  Patient Currently in Pain: Denies  Pain Assessment: 0-10  Pain Level: 0    Treatment:  Exercises:  Exercises  Exercise 1: bike 5  Exercise 2: hip add with ball [id number]  Exercise 3: hip abd with band green [id number]  Exercise 4: LAQ [id number] /ham curl x10 GTB  Exercise 6: SKTC 3x30 sec  Exercise 7: supine LTR 10 sec x10    Objective Measures:   LTG 1 Current Status[de-identified] Issued LTR hip abd, add, LAQ ,        Assessment: Body Structures, Functions, Activity Limitations Requiring Skilled Therapeutic Intervention: Decreased functional mobility , Decreased ROM, Decreased strength, Decreased posture, Increased pain, Decreased ADL status  Assessment: Pt needed vc to decrease intensity with LTR to increase sarwat. Treatment Diagnosis: LBP with strength and ROM defictis affecting overall posture.   Therapy Prognosis: Good          Post-Pain Assessment:       Pain Rating (0-10 pain scale):  0 /10   Location and pain description same as pre-treatment unless indicated. Action: [] NA   [x] Perform HEP  [] Meds as prescribed  [] Modalities as prescribed   [] Call Physician     GOALS   Patient Goal(s):      Short Term Goals Completed by 2 weeks Goal Status   STG 1 The pt will demonstrate improved postural awareness requiring <25% VC's with exercises In progress   STG 2 Decrease Lumbar pain 50% to assist with improved functional gains. In progress   STG 3 Demo increased hamstring flexibility -25° at 90/90 hip/knee to allow improved upright posture. In progress     Long Term Goals Completed by 4-6 weeks Goal Status   LTG 1 Indep HEP for symptom management In progress   LTG 2 Pt demo improved overall function by reporting greater than 80% per functional survey score In progress   LTG 3 Pain-free Lumbar AROM to 75% WNL allowing an increase in ADL tolerance. In progress   LTG 4 Improve B LE hip strength 4/5 to allow patient to improve walking 25 min In progress   LTG 5 The pt will demo improved B SLS >/=5 seconds to increase ease with ambulation without UE support In progress     Plan:  Frequency/Duration:  Plan  Plan Frequency: 1-2  Plan weeks: 4-6  Current Treatment Recommendations: Strengthening, ROM, Home exercise program, Modalities, Neuromuscular re-education, Manual, Stair training, Gait training, Balance training, Functional mobility training  Modalities: Heat/Cold, E-stim - unattended, Ultrasound  Pt to continue current HEP. See objective section for any therapeutic exercise changes, additions or modifications this date.     Therapy Time:      PT Individual Minutes  Time In: 3223  Time Out: 1150  Minutes: 42  Timed Code Treatment Minutes: 42 Minutes    Timed Activity Minutes Units   Ther Ex 42 3     Electronically signed by Lorena Coombs PTA on 3/14/23 at 12:43 PM EDT

## 2023-03-20 ENCOUNTER — HOSPITAL ENCOUNTER (OUTPATIENT)
Dept: PHYSICAL THERAPY | Age: 73
Setting detail: THERAPIES SERIES
Discharge: HOME | End: 2023-03-20
Payer: COMMERCIAL

## 2023-03-20 NOTE — PROGRESS NOTES
Therapy                            Cancellation/No-show Note    Date: 2023  Patient: Adam Reyna (85 y.o. male)  : 1950  MRN:  [id number]  Referring Physician: Mely Tidwell MD    Medical Diagnosis: Low back pain, unspecified [M54.50]  Other chronic pain [G89.29]      Visit Information:  Insurance: Payor: [employer] / Plan: [employer] MEDFLEX / Product Type: *No Product type* /   Visits to Date: 2   No Show/Cancelled Appts: 0 / 1      For today's appointment patient:  [x]  Cancelled  []  Rescheduled appointment  []  No-show   []  Called pt to remind of next appointment     Reason given by patient:  []  Patient ill  [x]  Conflicting appointment  []  No transportation    []  Conflict with work  []  No reason given  []  Other:      [x] Pt has future appointments scheduled, no follow up needed  [] Pt requests to be on hold. Reason:   If > 2 weeks please discuss with therapist.  [] Therapist to call pt for follow up     Comments:   Pt asked to be on hold going to apply for financial assistance.      Signature: Electronically signed by Nir Hutchinson PTA on 3/20/23 at 10:56 AM EDT

## 2023-03-27 LAB — NONINV COLON CA DNA+OCC BLD SCRN STL QL: NEGATIVE

## 2023-04-07 DIAGNOSIS — E11.9 DIABETES TYPE 2, CONTROLLED (HCC): ICD-10-CM

## 2024-01-15 RX ORDER — LISINOPRIL 5 MG/1
TABLET ORAL
Qty: 180 TABLET | Refills: 0 | Status: SHIPPED | OUTPATIENT
Start: 2024-01-15

## 2024-03-01 ENCOUNTER — PATIENT MESSAGE (OUTPATIENT)
Dept: FAMILY MEDICINE CLINIC | Age: 74
End: 2024-03-01

## 2024-03-01 DIAGNOSIS — B35.1 ONYCHOMYCOSIS: Primary | ICD-10-CM

## 2024-03-01 DIAGNOSIS — E11.8 DIABETIC FOOT (HCC): ICD-10-CM

## 2024-03-01 NOTE — TELEPHONE ENCOUNTER
From: Regino Butcher  Sent: 3/1/2024 2:40 PM EST  To: Aleta Martinez  Clinical Staff  Subject: Senior Toe Nail Clippings     Last year when I when I was in the walk-in- clinic I asked about senior toe nail clippings, and the  told me , \"they are not in today, usually they are in on Tuesdays and Thursdays\"..at least from what I recall those WERE the days?

## 2024-04-01 DIAGNOSIS — E11.9 DIABETES TYPE 2, CONTROLLED (HCC): ICD-10-CM

## 2024-04-15 RX ORDER — LISINOPRIL 5 MG/1
TABLET ORAL
Qty: 180 TABLET | Refills: 3 | OUTPATIENT
Start: 2024-04-15